# Patient Record
Sex: MALE | Race: WHITE | NOT HISPANIC OR LATINO
[De-identification: names, ages, dates, MRNs, and addresses within clinical notes are randomized per-mention and may not be internally consistent; named-entity substitution may affect disease eponyms.]

---

## 2017-06-26 ENCOUNTER — APPOINTMENT (OUTPATIENT)
Dept: HEART AND VASCULAR | Facility: CLINIC | Age: 52
End: 2017-06-26

## 2017-10-02 ENCOUNTER — APPOINTMENT (OUTPATIENT)
Dept: HEART AND VASCULAR | Facility: CLINIC | Age: 52
End: 2017-10-02
Payer: MEDICAID

## 2017-10-02 VITALS
SYSTOLIC BLOOD PRESSURE: 126 MMHG | HEART RATE: 58 BPM | BODY MASS INDEX: 36.49 KG/M2 | WEIGHT: 240 LBS | DIASTOLIC BLOOD PRESSURE: 80 MMHG

## 2017-10-02 DIAGNOSIS — H40.9 UNSPECIFIED GLAUCOMA: ICD-10-CM

## 2017-10-02 PROCEDURE — 99214 OFFICE O/P EST MOD 30 MIN: CPT | Mod: 25

## 2017-10-02 PROCEDURE — 93000 ELECTROCARDIOGRAM COMPLETE: CPT

## 2017-10-02 RX ORDER — INSULIN GLARGINE 300 U/ML
300 INJECTION, SOLUTION SUBCUTANEOUS
Refills: 0 | Status: ACTIVE | COMMUNITY

## 2017-11-05 ENCOUNTER — FORM ENCOUNTER (OUTPATIENT)
Age: 52
End: 2017-11-05

## 2017-11-06 ENCOUNTER — OUTPATIENT (OUTPATIENT)
Dept: OUTPATIENT SERVICES | Facility: HOSPITAL | Age: 52
LOS: 1 days | End: 2017-11-06
Payer: COMMERCIAL

## 2017-11-06 DIAGNOSIS — F17.200 NICOTINE DEPENDENCE, UNSPECIFIED, UNCOMPLICATED: ICD-10-CM

## 2017-11-06 DIAGNOSIS — I25.10 ATHEROSCLEROTIC HEART DISEASE OF NATIVE CORONARY ARTERY WITHOUT ANGINA PECTORIS: ICD-10-CM

## 2017-11-06 DIAGNOSIS — E78.5 HYPERLIPIDEMIA, UNSPECIFIED: ICD-10-CM

## 2017-11-06 DIAGNOSIS — E11.9 TYPE 2 DIABETES MELLITUS WITHOUT COMPLICATIONS: ICD-10-CM

## 2017-11-06 LAB — GLUCOSE BLDC GLUCOMTR-MCNC: 135 MG/DL — HIGH (ref 70–99)

## 2017-11-06 PROCEDURE — 93016 CV STRESS TEST SUPVJ ONLY: CPT

## 2017-11-06 PROCEDURE — 78452 HT MUSCLE IMAGE SPECT MULT: CPT | Mod: 26

## 2017-11-06 PROCEDURE — 82962 GLUCOSE BLOOD TEST: CPT

## 2017-11-06 PROCEDURE — 93017 CV STRESS TEST TRACING ONLY: CPT

## 2017-11-06 PROCEDURE — 93018 CV STRESS TEST I&R ONLY: CPT

## 2017-11-06 PROCEDURE — A9500: CPT

## 2017-11-06 PROCEDURE — 78452 HT MUSCLE IMAGE SPECT MULT: CPT

## 2017-11-09 ENCOUNTER — TRANSCRIPTION ENCOUNTER (OUTPATIENT)
Age: 52
End: 2017-11-09

## 2018-01-23 ENCOUNTER — RX RENEWAL (OUTPATIENT)
Age: 53
End: 2018-01-23

## 2018-03-27 ENCOUNTER — APPOINTMENT (OUTPATIENT)
Dept: HEART AND VASCULAR | Facility: CLINIC | Age: 53
End: 2018-03-27
Payer: MEDICAID

## 2018-03-27 VITALS
HEIGHT: 68 IN | BODY MASS INDEX: 36.37 KG/M2 | DIASTOLIC BLOOD PRESSURE: 62 MMHG | WEIGHT: 240 LBS | SYSTOLIC BLOOD PRESSURE: 102 MMHG | HEART RATE: 61 BPM

## 2018-03-27 DIAGNOSIS — R07.89 OTHER CHEST PAIN: ICD-10-CM

## 2018-03-27 PROCEDURE — 93000 ELECTROCARDIOGRAM COMPLETE: CPT

## 2018-03-27 PROCEDURE — 99214 OFFICE O/P EST MOD 30 MIN: CPT | Mod: 25

## 2018-03-27 RX ORDER — INSULIN GLARGINE 100 [IU]/ML
100 INJECTION, SOLUTION SUBCUTANEOUS
Qty: 15 | Refills: 0 | Status: DISCONTINUED | COMMUNITY
Start: 2018-01-23

## 2018-03-27 RX ORDER — TERBINAFINE HYDROCHLORIDE 1 G/100G
1 CREAM TOPICAL
Qty: 60 | Refills: 0 | Status: DISCONTINUED | COMMUNITY
Start: 2018-01-22

## 2018-03-27 RX ORDER — BIMATOPROST 0.1 MG/ML
0.01 SOLUTION/ DROPS OPHTHALMIC
Qty: 2 | Refills: 0 | Status: ACTIVE | COMMUNITY
Start: 2017-12-19

## 2018-03-27 RX ORDER — MELOXICAM 15 MG/1
15 TABLET ORAL
Qty: 30 | Refills: 0 | Status: DISCONTINUED | COMMUNITY
Start: 2018-03-04

## 2018-03-27 RX ORDER — METFORMIN HYDROCHLORIDE 1000 MG/1
1000 TABLET, COATED ORAL
Qty: 60 | Refills: 0 | Status: ACTIVE | COMMUNITY
Start: 2017-12-25

## 2018-03-27 RX ORDER — BLOOD-GLUCOSE METER
32G X 4 MM EACH MISCELLANEOUS
Qty: 100 | Refills: 0 | Status: ACTIVE | COMMUNITY
Start: 2017-12-14

## 2018-03-27 RX ORDER — BLOOD-GLUCOSE METER
EACH MISCELLANEOUS
Qty: 100 | Refills: 0 | Status: ACTIVE | COMMUNITY
Start: 2018-01-15

## 2018-03-27 RX ORDER — BLOOD SUGAR DIAGNOSTIC
STRIP MISCELLANEOUS
Qty: 100 | Refills: 0 | Status: ACTIVE | COMMUNITY
Start: 2018-01-14

## 2018-03-27 RX ORDER — KETOCONAZOLE 200 MG/1
200 TABLET ORAL
Qty: 10 | Refills: 0 | Status: DISCONTINUED | COMMUNITY
Start: 2018-01-18

## 2018-03-27 RX ORDER — BUPROPION HYDROCHLORIDE 150 MG/1
150 TABLET, EXTENDED RELEASE ORAL
Qty: 60 | Refills: 0 | Status: DISCONTINUED | COMMUNITY
Start: 2017-10-17

## 2018-03-27 RX ORDER — INSULIN ASPART 100 [IU]/ML
100 INJECTION, SOLUTION INTRAVENOUS; SUBCUTANEOUS
Qty: 15 | Refills: 0 | Status: ACTIVE | COMMUNITY
Start: 2018-02-28

## 2018-03-27 RX ORDER — ALPRAZOLAM 0.25 MG/1
0.25 TABLET ORAL
Qty: 90 | Refills: 0 | Status: ACTIVE | COMMUNITY
Start: 2018-01-02

## 2018-03-27 RX ORDER — FENOFIBRATE 134 MG/1
134 CAPSULE ORAL
Qty: 30 | Refills: 0 | Status: DISCONTINUED | COMMUNITY
Start: 2017-12-13

## 2018-04-08 ENCOUNTER — CLINICAL ADVICE (OUTPATIENT)
Age: 53
End: 2018-04-08

## 2018-04-10 ENCOUNTER — RX RENEWAL (OUTPATIENT)
Age: 53
End: 2018-04-10

## 2018-08-06 VITALS
RESPIRATION RATE: 18 BRPM | OXYGEN SATURATION: 97 % | DIASTOLIC BLOOD PRESSURE: 73 MMHG | SYSTOLIC BLOOD PRESSURE: 108 MMHG | TEMPERATURE: 99 F | HEART RATE: 84 BPM

## 2018-08-06 LAB
ALBUMIN SERPL ELPH-MCNC: 4.4 G/DL — SIGNIFICANT CHANGE UP (ref 3.3–5)
ALP SERPL-CCNC: 58 U/L — SIGNIFICANT CHANGE UP (ref 40–120)
ALT FLD-CCNC: 23 U/L — SIGNIFICANT CHANGE UP (ref 10–45)
ANION GAP SERPL CALC-SCNC: 15 MMOL/L — SIGNIFICANT CHANGE UP (ref 5–17)
APPEARANCE UR: CLEAR — SIGNIFICANT CHANGE UP
APTT BLD: 25.9 SEC — LOW (ref 27.5–37.4)
AST SERPL-CCNC: 19 U/L — SIGNIFICANT CHANGE UP (ref 10–40)
BASE EXCESS BLDV CALC-SCNC: 2.4 MMOL/L — SIGNIFICANT CHANGE UP
BILIRUB SERPL-MCNC: 0.9 MG/DL — SIGNIFICANT CHANGE UP (ref 0.2–1.2)
BILIRUB UR-MCNC: NEGATIVE — SIGNIFICANT CHANGE UP
BUN SERPL-MCNC: 20 MG/DL — SIGNIFICANT CHANGE UP (ref 7–23)
CA-I SERPL-SCNC: 1.19 MMOL/L — SIGNIFICANT CHANGE UP (ref 1.12–1.3)
CALCIUM SERPL-MCNC: 9.5 MG/DL — SIGNIFICANT CHANGE UP (ref 8.4–10.5)
CHLORIDE SERPL-SCNC: 96 MMOL/L — SIGNIFICANT CHANGE UP (ref 96–108)
CK MB CFR SERPL CALC: 2.7 NG/ML — SIGNIFICANT CHANGE UP (ref 0–6.7)
CK SERPL-CCNC: 50 U/L — SIGNIFICANT CHANGE UP (ref 30–200)
CO2 SERPL-SCNC: 25 MMOL/L — SIGNIFICANT CHANGE UP (ref 22–31)
COLOR SPEC: YELLOW — SIGNIFICANT CHANGE UP
CREAT SERPL-MCNC: 0.99 MG/DL — SIGNIFICANT CHANGE UP (ref 0.5–1.3)
DIFF PNL FLD: NEGATIVE — SIGNIFICANT CHANGE UP
GAS PNL BLDV: 138 MMOL/L — SIGNIFICANT CHANGE UP (ref 138–146)
GAS PNL BLDV: SIGNIFICANT CHANGE UP
GAS PNL BLDV: SIGNIFICANT CHANGE UP
GLUCOSE SERPL-MCNC: 181 MG/DL — HIGH (ref 70–99)
GLUCOSE UR QL: >=1000
HCO3 BLDV-SCNC: 28 MMOL/L — HIGH (ref 20–27)
HCT VFR BLD CALC: 50.7 % — HIGH (ref 39–50)
HGB BLD-MCNC: 17.4 G/DL — HIGH (ref 13–17)
INR BLD: 0.96 — SIGNIFICANT CHANGE UP (ref 0.88–1.16)
KETONES UR-MCNC: NEGATIVE — SIGNIFICANT CHANGE UP
LACTATE SERPL-SCNC: 2 MMOL/L — SIGNIFICANT CHANGE UP (ref 0.5–2)
LEUKOCYTE ESTERASE UR-ACNC: NEGATIVE — SIGNIFICANT CHANGE UP
LYMPHOCYTES # BLD AUTO: 9 % — LOW (ref 13–44)
MCHC RBC-ENTMCNC: 29.4 PG — SIGNIFICANT CHANGE UP (ref 27–34)
MCHC RBC-ENTMCNC: 34.3 G/DL — SIGNIFICANT CHANGE UP (ref 32–36)
MCV RBC AUTO: 85.8 FL — SIGNIFICANT CHANGE UP (ref 80–100)
MONOCYTES NFR BLD AUTO: 4 % — SIGNIFICANT CHANGE UP (ref 2–14)
NEUTROPHILS NFR BLD AUTO: 84 % — HIGH (ref 43–77)
NEUTS BAND # BLD: 3 % — SIGNIFICANT CHANGE UP
NITRITE UR-MCNC: NEGATIVE — SIGNIFICANT CHANGE UP
PCO2 BLDV: 44 MMHG — SIGNIFICANT CHANGE UP (ref 41–51)
PH BLDV: 7.42 — SIGNIFICANT CHANGE UP (ref 7.32–7.43)
PH UR: 6.5 — SIGNIFICANT CHANGE UP (ref 5–8)
PLAT MORPH BLD: NORMAL — SIGNIFICANT CHANGE UP
PLATELET # BLD AUTO: 192 K/UL — SIGNIFICANT CHANGE UP (ref 150–400)
PO2 BLDV: 34 MMHG — SIGNIFICANT CHANGE UP
POTASSIUM BLDV-SCNC: 4.1 MMOL/L — SIGNIFICANT CHANGE UP (ref 3.5–4.9)
POTASSIUM SERPL-MCNC: 4.2 MMOL/L — SIGNIFICANT CHANGE UP (ref 3.5–5.3)
POTASSIUM SERPL-SCNC: 4.2 MMOL/L — SIGNIFICANT CHANGE UP (ref 3.5–5.3)
PROT SERPL-MCNC: 7.5 G/DL — SIGNIFICANT CHANGE UP (ref 6–8.3)
PROT UR-MCNC: NEGATIVE MG/DL — SIGNIFICANT CHANGE UP
PROTHROM AB SERPL-ACNC: 10.6 SEC — SIGNIFICANT CHANGE UP (ref 9.8–12.7)
RBC # BLD: 5.91 M/UL — HIGH (ref 4.2–5.8)
RBC # FLD: 13.8 % — SIGNIFICANT CHANGE UP (ref 10.3–16.9)
RBC BLD AUTO: NORMAL — SIGNIFICANT CHANGE UP
SAO2 % BLDV: 54 % — SIGNIFICANT CHANGE UP
SODIUM SERPL-SCNC: 136 MMOL/L — SIGNIFICANT CHANGE UP (ref 135–145)
SP GR SPEC: 1.01 — SIGNIFICANT CHANGE UP (ref 1–1.03)
TROPONIN T SERPL-MCNC: <0.01 NG/ML — SIGNIFICANT CHANGE UP (ref 0–0.01)
UROBILINOGEN FLD QL: 2 E.U./DL
WBC # BLD: 12.2 K/UL — HIGH (ref 3.8–10.5)
WBC # FLD AUTO: 12.2 K/UL — HIGH (ref 3.8–10.5)

## 2018-08-06 PROCEDURE — 70450 CT HEAD/BRAIN W/O DYE: CPT | Mod: 26

## 2018-08-06 PROCEDURE — 71045 X-RAY EXAM CHEST 1 VIEW: CPT | Mod: 26

## 2018-08-06 RX ORDER — CIPROFLOXACIN LACTATE 400MG/40ML
400 VIAL (ML) INTRAVENOUS ONCE
Qty: 0 | Refills: 0 | Status: COMPLETED | OUTPATIENT
Start: 2018-08-06 | End: 2018-08-06

## 2018-08-06 RX ORDER — SODIUM CHLORIDE 9 MG/ML
1000 INJECTION INTRAMUSCULAR; INTRAVENOUS; SUBCUTANEOUS ONCE
Qty: 0 | Refills: 0 | Status: DISCONTINUED | OUTPATIENT
Start: 2018-08-06 | End: 2018-08-06

## 2018-08-06 RX ORDER — METRONIDAZOLE 500 MG
500 TABLET ORAL ONCE
Qty: 0 | Refills: 0 | Status: COMPLETED | OUTPATIENT
Start: 2018-08-06 | End: 2018-08-06

## 2018-08-06 RX ORDER — SODIUM CHLORIDE 9 MG/ML
1000 INJECTION INTRAMUSCULAR; INTRAVENOUS; SUBCUTANEOUS ONCE
Qty: 0 | Refills: 0 | Status: COMPLETED | OUTPATIENT
Start: 2018-08-06 | End: 2018-08-06

## 2018-08-06 RX ORDER — IOHEXOL 300 MG/ML
30 INJECTION, SOLUTION INTRAVENOUS ONCE
Qty: 0 | Refills: 0 | Status: COMPLETED | OUTPATIENT
Start: 2018-08-06 | End: 2018-08-06

## 2018-08-06 RX ORDER — ACETAMINOPHEN 500 MG
650 TABLET ORAL ONCE
Qty: 0 | Refills: 0 | Status: COMPLETED | OUTPATIENT
Start: 2018-08-06 | End: 2018-08-06

## 2018-08-06 RX ADMIN — IOHEXOL 30 MILLILITER(S): 300 INJECTION, SOLUTION INTRAVENOUS at 21:39

## 2018-08-06 RX ADMIN — SODIUM CHLORIDE 1000 MILLILITER(S): 9 INJECTION INTRAMUSCULAR; INTRAVENOUS; SUBCUTANEOUS at 19:40

## 2018-08-06 RX ADMIN — Medication 200 MILLIGRAM(S): at 22:21

## 2018-08-06 RX ADMIN — Medication 650 MILLIGRAM(S): at 20:57

## 2018-08-06 RX ADMIN — Medication 100 MILLIGRAM(S): at 21:39

## 2018-08-06 NOTE — ED PROVIDER NOTE - MEDICAL DECISION MAKING DETAILS
53 yo male smoker with fever to 100.8 F  abd pain llq - near syncope-neg trop  no ekg changes-  awaiting urine and CT abd pelvis - possible admit based on CT

## 2018-08-06 NOTE — ED ADULT NURSE NOTE - OBJECTIVE STATEMENT
Pt with near syncope earlier today, felt very dizzy along with diaphoresis. Denies CP, palpitations, SOB, or fall at time of event. Subjective fever today. Pt AAOx3, resp even and unlabored, wife at BS.

## 2018-08-06 NOTE — ED ADULT NURSE NOTE - NSIMPLEMENTINTERV_GEN_ALL_ED
Implemented All Universal Safety Interventions:  [place] to call system. Call bell, personal items and telephone within reach. Instruct patient to call for assistance. Room bathroom lighting operational. Non-slip footwear when patient is off stretcher. Physically safe environment: no spills, clutter or unnecessary equipment. Stretcher in lowest position, wheels locked, appropriate side rails in place.

## 2018-08-06 NOTE — ED ADULT TRIAGE NOTE - CHIEF COMPLAINT QUOTE
BIBA c/o near syncope, states " I was feeling dizzy and just lay before I could fall". pt A&Ox4, denies any pain at this time. no weakness or facial droop noted. FS on the field.

## 2018-08-06 NOTE — ED PROVIDER NOTE - CARE PLAN
Principal Discharge DX:	Near syncope  Secondary Diagnosis:	Fever Principal Discharge DX:	Near syncope  Secondary Diagnosis:	Fever  Secondary Diagnosis:	Abdominal pain

## 2018-08-06 NOTE — ED PROVIDER NOTE - OBJECTIVE STATEMENT
51 yo male smoker hx of CAD HTN DM states he felt weak earlier 3 hr ago as though he could pass out- no dizziness or weakness - no room spinning  no chest pain or back pain - no N/V or abd pain no diarrhea ? EMS thought pt may have had slight facial asymmetry- also wife said intermittent facial drooping since Saturday  no headache or upper ext weakness no gait instability- no prior hx of CVa or TIA - also feels as though he is having chills at times  no cough or sore throat  no tick or [place] bites no skin rashes 53 yo male smoker hx of CAD HTN DM states he felt weak earlier 3 hr ago as though he could pass out- no dizziness or weakness - no room spinning  no chest pain or back pain - no N/V mild lower abd pain- no diarrhea ? EMS thought pt may have had slight facial asymmetry- also wife said intermittent facial drooping since Saturday  no headache or upper ext weakness no gait instability- no prior hx of CVa or TIA - also feels as though he is having chills at times  no cough or sore throat  no tick or [place] bites no skin rashes 51 yo male smoker hx of CAD HTN DM states he felt weak earlier 3 hr ago as though he could pass out- no dizziness or weakness - no room spinning  no chest pain or back pain - no N/V mild lower abd pain- prior hx of SBO/ has residual hernia  no diarrhea ? EMS thought pt may have had slight facial asymmetry- also wife said intermittent facial drooping since Saturday  no headache or upper ext weakness no gait instability- no prior hx of CVA or TIA - also feels as though he is having chills at times  no cough or sore throat  no tick or mosquito bites no skin rashes 51 yo male smoker hx of CAD HTN DM states he felt weak earlier 3 hr ago as though he could pass out- no dizziness or weakness - no room spinning  no chest pain or back pain - no N/V mild lower abd pain- prior hx of gastric sleeve--subsequent SBO/ has residual hernia  no diarrhea ? EMS thought pt may have had slight facial asymmetry- also wife said intermittent facial drooping since Saturday  no headache or upper ext weakness no gait instability- no prior hx of CVA or TIA - also feels as though he is having chills at times  no cough or sore throat  no tick or mosquito bites no skin rashes

## 2018-08-07 ENCOUNTER — INPATIENT (INPATIENT)
Facility: HOSPITAL | Age: 53
LOS: 0 days | Discharge: HOME | DRG: 312 | End: 2018-08-07
Payer: COMMERCIAL

## 2018-08-07 ENCOUNTER — TRANSCRIPTION ENCOUNTER (OUTPATIENT)
Age: 53
End: 2018-08-07

## 2018-08-07 VITALS
HEART RATE: 62 BPM | RESPIRATION RATE: 18 BRPM | SYSTOLIC BLOOD PRESSURE: 119 MMHG | TEMPERATURE: 98 F | OXYGEN SATURATION: 94 % | DIASTOLIC BLOOD PRESSURE: 71 MMHG

## 2018-08-07 DIAGNOSIS — R55 SYNCOPE AND COLLAPSE: ICD-10-CM

## 2018-08-07 DIAGNOSIS — Z90.3 ACQUIRED ABSENCE OF STOMACH [PART OF]: Chronic | ICD-10-CM

## 2018-08-07 DIAGNOSIS — E11.9 TYPE 2 DIABETES MELLITUS WITHOUT COMPLICATIONS: ICD-10-CM

## 2018-08-07 DIAGNOSIS — I10 ESSENTIAL (PRIMARY) HYPERTENSION: ICD-10-CM

## 2018-08-07 DIAGNOSIS — I25.10 ATHEROSCLEROTIC HEART DISEASE OF NATIVE CORONARY ARTERY WITHOUT ANGINA PECTORIS: ICD-10-CM

## 2018-08-07 DIAGNOSIS — Z91.89 OTHER SPECIFIED PERSONAL RISK FACTORS, NOT ELSEWHERE CLASSIFIED: ICD-10-CM

## 2018-08-07 LAB
ALBUMIN SERPL ELPH-MCNC: 3.2 G/DL — LOW (ref 3.3–5)
ALP SERPL-CCNC: 44 U/L — SIGNIFICANT CHANGE UP (ref 40–120)
ALT FLD-CCNC: 20 U/L — SIGNIFICANT CHANGE UP (ref 10–45)
ANION GAP SERPL CALC-SCNC: 14 MMOL/L — SIGNIFICANT CHANGE UP (ref 5–17)
AST SERPL-CCNC: 17 U/L — SIGNIFICANT CHANGE UP (ref 10–40)
BASOPHILS NFR BLD AUTO: 0.1 % — SIGNIFICANT CHANGE UP (ref 0–2)
BILIRUB SERPL-MCNC: 1.2 MG/DL — SIGNIFICANT CHANGE UP (ref 0.2–1.2)
BUN SERPL-MCNC: 18 MG/DL — SIGNIFICANT CHANGE UP (ref 7–23)
CALCIUM SERPL-MCNC: 8.2 MG/DL — LOW (ref 8.4–10.5)
CHLORIDE SERPL-SCNC: 103 MMOL/L — SIGNIFICANT CHANGE UP (ref 96–108)
CHOLEST SERPL-MCNC: 114 MG/DL — SIGNIFICANT CHANGE UP (ref 10–199)
CO2 SERPL-SCNC: 23 MMOL/L — SIGNIFICANT CHANGE UP (ref 22–31)
CREAT SERPL-MCNC: 0.93 MG/DL — SIGNIFICANT CHANGE UP (ref 0.5–1.3)
EOSINOPHIL NFR BLD AUTO: 1.2 % — SIGNIFICANT CHANGE UP (ref 0–6)
GLUCOSE BLDC GLUCOMTR-MCNC: 104 MG/DL — HIGH (ref 70–99)
GLUCOSE BLDC GLUCOMTR-MCNC: 119 MG/DL — HIGH (ref 70–99)
GLUCOSE SERPL-MCNC: 128 MG/DL — HIGH (ref 70–99)
HBA1C BLD-MCNC: 8.7 % — HIGH (ref 4–5.6)
HCT VFR BLD CALC: 45.8 % — SIGNIFICANT CHANGE UP (ref 39–50)
HDLC SERPL-MCNC: 26 MG/DL — LOW (ref 40–125)
HGB BLD-MCNC: 15.3 G/DL — SIGNIFICANT CHANGE UP (ref 13–17)
LIPID PNL WITH DIRECT LDL SERPL: 58 MG/DL — SIGNIFICANT CHANGE UP
LYMPHOCYTES # BLD AUTO: 14 % — SIGNIFICANT CHANGE UP (ref 13–44)
MAGNESIUM SERPL-MCNC: 1.7 MG/DL — SIGNIFICANT CHANGE UP (ref 1.6–2.6)
MCHC RBC-ENTMCNC: 29 PG — SIGNIFICANT CHANGE UP (ref 27–34)
MCHC RBC-ENTMCNC: 33.4 G/DL — SIGNIFICANT CHANGE UP (ref 32–36)
MCV RBC AUTO: 86.7 FL — SIGNIFICANT CHANGE UP (ref 80–100)
MONOCYTES NFR BLD AUTO: 6.3 % — SIGNIFICANT CHANGE UP (ref 2–14)
NEUTROPHILS NFR BLD AUTO: 78.4 % — HIGH (ref 43–77)
PLATELET # BLD AUTO: 155 K/UL — SIGNIFICANT CHANGE UP (ref 150–400)
POTASSIUM SERPL-MCNC: 3.6 MMOL/L — SIGNIFICANT CHANGE UP (ref 3.5–5.3)
POTASSIUM SERPL-SCNC: 3.6 MMOL/L — SIGNIFICANT CHANGE UP (ref 3.5–5.3)
PROT SERPL-MCNC: 6 G/DL — SIGNIFICANT CHANGE UP (ref 6–8.3)
RAPID RVP RESULT: SIGNIFICANT CHANGE UP
RBC # BLD: 5.28 M/UL — SIGNIFICANT CHANGE UP (ref 4.2–5.8)
RBC # FLD: 13.9 % — SIGNIFICANT CHANGE UP (ref 10.3–16.9)
SODIUM SERPL-SCNC: 140 MMOL/L — SIGNIFICANT CHANGE UP (ref 135–145)
TOTAL CHOLESTEROL/HDL RATIO MEASUREMENT: 4.4 RATIO — SIGNIFICANT CHANGE UP (ref 3.4–9.6)
TRIGL SERPL-MCNC: 152 MG/DL — HIGH (ref 10–149)
WBC # BLD: 8.1 K/UL — SIGNIFICANT CHANGE UP (ref 3.8–10.5)
WBC # FLD AUTO: 8.1 K/UL — SIGNIFICANT CHANGE UP (ref 3.8–10.5)

## 2018-08-07 PROCEDURE — 99285 EMERGENCY DEPT VISIT HI MDM: CPT | Mod: 25

## 2018-08-07 PROCEDURE — 71045 X-RAY EXAM CHEST 1 VIEW: CPT | Mod: 26

## 2018-08-07 PROCEDURE — 74177 CT ABD & PELVIS W/CONTRAST: CPT | Mod: 26

## 2018-08-07 PROCEDURE — 99238 HOSP IP/OBS DSCHRG MGMT 30/<: CPT

## 2018-08-07 PROCEDURE — 93306 TTE W/DOPPLER COMPLETE: CPT | Mod: 26

## 2018-08-07 PROCEDURE — 93010 ELECTROCARDIOGRAM REPORT: CPT

## 2018-08-07 RX ORDER — PANTOPRAZOLE SODIUM 20 MG/1
40 TABLET, DELAYED RELEASE ORAL
Qty: 0 | Refills: 0 | Status: DISCONTINUED | OUTPATIENT
Start: 2018-08-07 | End: 2018-08-07

## 2018-08-07 RX ORDER — METOPROLOL TARTRATE 50 MG
50 TABLET ORAL DAILY
Qty: 0 | Refills: 0 | Status: DISCONTINUED | OUTPATIENT
Start: 2018-08-07 | End: 2018-08-07

## 2018-08-07 RX ORDER — ASPIRIN/CALCIUM CARB/MAGNESIUM 324 MG
81 TABLET ORAL DAILY
Qty: 0 | Refills: 0 | Status: DISCONTINUED | OUTPATIENT
Start: 2018-08-07 | End: 2018-08-07

## 2018-08-07 RX ORDER — INSULIN LISPRO 100/ML
VIAL (ML) SUBCUTANEOUS AT BEDTIME
Qty: 0 | Refills: 0 | Status: DISCONTINUED | OUTPATIENT
Start: 2018-08-07 | End: 2018-08-07

## 2018-08-07 RX ORDER — ASPIRIN/CALCIUM CARB/MAGNESIUM 324 MG
0 TABLET ORAL
Qty: 0 | Refills: 0 | COMMUNITY

## 2018-08-07 RX ORDER — PANTOPRAZOLE SODIUM 20 MG/1
1 TABLET, DELAYED RELEASE ORAL
Qty: 0 | Refills: 0 | COMMUNITY

## 2018-08-07 RX ORDER — ESCITALOPRAM OXALATE 10 MG/1
20 TABLET, FILM COATED ORAL DAILY
Qty: 0 | Refills: 0 | Status: DISCONTINUED | OUTPATIENT
Start: 2018-08-07 | End: 2018-08-07

## 2018-08-07 RX ORDER — DEXTROSE 50 % IN WATER 50 %
25 SYRINGE (ML) INTRAVENOUS ONCE
Qty: 0 | Refills: 0 | Status: DISCONTINUED | OUTPATIENT
Start: 2018-08-07 | End: 2018-08-07

## 2018-08-07 RX ORDER — SODIUM CHLORIDE 9 MG/ML
1000 INJECTION, SOLUTION INTRAVENOUS
Qty: 0 | Refills: 0 | Status: DISCONTINUED | OUTPATIENT
Start: 2018-08-07 | End: 2018-08-07

## 2018-08-07 RX ORDER — ATORVASTATIN CALCIUM 80 MG/1
1 TABLET, FILM COATED ORAL
Qty: 0 | Refills: 0 | COMMUNITY

## 2018-08-07 RX ORDER — INSULIN GLARGINE 100 [IU]/ML
44 INJECTION, SOLUTION SUBCUTANEOUS
Qty: 0 | Refills: 0 | COMMUNITY

## 2018-08-07 RX ORDER — GLUCAGON INJECTION, SOLUTION 0.5 MG/.1ML
1 INJECTION, SOLUTION SUBCUTANEOUS ONCE
Qty: 0 | Refills: 0 | Status: DISCONTINUED | OUTPATIENT
Start: 2018-08-07 | End: 2018-08-07

## 2018-08-07 RX ORDER — FENOFIBRATE,MICRONIZED 130 MG
1 CAPSULE ORAL
Qty: 0 | Refills: 0 | COMMUNITY

## 2018-08-07 RX ORDER — DEXTROSE 50 % IN WATER 50 %
12.5 SYRINGE (ML) INTRAVENOUS ONCE
Qty: 0 | Refills: 0 | Status: DISCONTINUED | OUTPATIENT
Start: 2018-08-07 | End: 2018-08-07

## 2018-08-07 RX ORDER — INSULIN GLARGINE 100 [IU]/ML
40 INJECTION, SOLUTION SUBCUTANEOUS AT BEDTIME
Qty: 0 | Refills: 0 | Status: DISCONTINUED | OUTPATIENT
Start: 2018-08-07 | End: 2018-08-07

## 2018-08-07 RX ORDER — METFORMIN HYDROCHLORIDE 850 MG/1
1 TABLET ORAL
Qty: 0 | Refills: 0 | COMMUNITY

## 2018-08-07 RX ORDER — METOPROLOL TARTRATE 50 MG
1 TABLET ORAL
Qty: 0 | Refills: 0 | COMMUNITY

## 2018-08-07 RX ORDER — INSULIN LISPRO 100/ML
VIAL (ML) SUBCUTANEOUS
Qty: 0 | Refills: 0 | Status: DISCONTINUED | OUTPATIENT
Start: 2018-08-07 | End: 2018-08-07

## 2018-08-07 RX ORDER — DEXTROSE 50 % IN WATER 50 %
15 SYRINGE (ML) INTRAVENOUS ONCE
Qty: 0 | Refills: 0 | Status: DISCONTINUED | OUTPATIENT
Start: 2018-08-07 | End: 2018-08-07

## 2018-08-07 RX ORDER — CANAGLIFLOZIN 100 MG/1
1 TABLET, FILM COATED ORAL
Qty: 0 | Refills: 0 | COMMUNITY

## 2018-08-07 RX ORDER — ATORVASTATIN CALCIUM 80 MG/1
20 TABLET, FILM COATED ORAL AT BEDTIME
Qty: 0 | Refills: 0 | Status: DISCONTINUED | OUTPATIENT
Start: 2018-08-07 | End: 2018-08-07

## 2018-08-07 RX ORDER — ESCITALOPRAM OXALATE 10 MG/1
1 TABLET, FILM COATED ORAL
Qty: 0 | Refills: 0 | COMMUNITY

## 2018-08-07 RX ADMIN — PANTOPRAZOLE SODIUM 40 MILLIGRAM(S): 20 TABLET, DELAYED RELEASE ORAL at 07:22

## 2018-08-07 RX ADMIN — Medication 50 MILLIGRAM(S): at 09:27

## 2018-08-07 NOTE — CONSULT NOTE ADULT - SUBJECTIVE AND OBJECTIVE BOX
GENERAL SURGERY CONSULT NOTE    HPI: 54 yo M w/ known ventral hernia, presenting after LOC due to syncopal episode. Patient reports that he has had this bulge in his abdomen for some time now, unsure when it began. He does not experience pain from hernia, and it freely comes in and out. Patient reports it has never been stuck. No issues with passing gas, or bowel movements. No vomiting, although patient was nauseous after passing out today.     Had a fever of 100.8 when he presented to ED.       PAST MEDICAL & SURGICAL HISTORY:  Hx of Gastric band,   LSG 3 years ago  SBO 3 years ago  CAD  DMII with insulin  Smoker 1 ppd      REVIEW OF SYSTEMS:   Pertinent positives/negatives noted in HPI.     HOME MEDICATIONS:  Insulin    ALLERGIES:  Allergies    vancomycin (Rash)  Zosyn (Rash)    Intolerances        SOCIAL HISTORY:    FAMILY HISTORY:      Vital Signs Last 24 Hrs  T(C): 37.6 (07 Aug 2018 00:24), Max: 38.2 (06 Aug 2018 21:05)  T(F): 99.6 (07 Aug 2018 00:24), Max: 100.8 (06 Aug 2018 21:05)  HR: 78 (07 Aug 2018 00:24) (78 - 85)  BP: 111/69 (07 Aug 2018 00:24) (108/73 - 111/69)  BP(mean): --  RR: 16 (07 Aug 2018 00:24) (16 - 18)  SpO2: 98% (07 Aug 2018 00:24) (97% - 99%)    PHYSICAL EXAM:  General: NAD  Abdominal: Soft, Large reducible hernia superior to umbilicus. NT, ND, on exam  Extremities: No edema,    LABS:                        17.4   12.2  )-----------( 192      ( 06 Aug 2018 19:42 )             50.7     -    136  |  96  |  20  ----------------------------<  181<H>  4.2   |  25  |  0.99    Ca    9.5      06 Aug 2018 19:42    TPro  7.5  /  Alb  4.4  /  TBili  0.9  /  DBili  x   /  AST  19  /  ALT  23  /  AlkPhos  58  08-    PT/INR - ( 06 Aug 2018 19:42 )   PT: 10.6 sec;   INR: 0.96          PTT - ( 06 Aug 2018 19:42 )  PTT:25.9 sec  Urinalysis Basic - ( 06 Aug 2018 23:36 )    Color: Yellow / Appearance: Clear / S.010 / pH: x  Gluc: x / Ketone: NEGATIVE  / Bili: Negative / Urobili: 2.0 E.U./dL   Blood: x / Protein: NEGATIVE mg/dL / Nitrite: NEGATIVE   Leuk Esterase: NEGATIVE / RBC: x / WBC x   Sq Epi: x / Non Sq Epi: x / Bacteria: x    < from: CT Abdomen and Pelvis w/ Oral Cont and w/ IV Cont (18 @ 00:20) >  Bowel/Peritoneum/Abdominal wall: Interval removal of gastric band and   sleeve gastrectomy. Three new midline upper ventral wall wide neck   hernias contain bowel. Largest is a moderate-sized hernia which is   located most superiorly and wall hernia contains nonobstructed small   bowel and transverse colon. No appreciable bowel wall thickening. Normal  appendix. No ascites or pneumoperitoneum. Small fat-containing bilateral   inguinal hernias.    < end of copied text >

## 2018-08-07 NOTE — DISCHARGE NOTE ADULT - HOSPITAL COURSE
51 yo M presenting to ED for one episode of pre-syncope, denies fall or head trauma, no hx of CVA/ TIA or seizures in the past.  PMH of CAD/ HTN/ DM type 2 Sleeve gastrectomy ~ 2 years ago cb SBO and abdominal ventral hernia. He was given 2L NS Bolus and symptoms resolved. Echocardiogram showed Normal EF and no valvular abnormalities. Patient was discharged home with plans for outpatient follow.

## 2018-08-07 NOTE — DISCHARGE NOTE ADULT - CARE PLAN
Principal Discharge DX:	Near syncope  Goal:	to resolve symptoms  Assessment and plan of treatment:	You were admitted for a condition called near syncope. This was most likely from being dehydrated and having an acute vaso-vagal response. The vasovagal syncope trigger causes your heart rate and blood pressure to drop suddenly. That leads to reduced blood flow to your brain, causing you to briefly lose consciousness.    Vasovagal syncope is usually harmless and requires no treatment. But it's possible you may injure yourself during a vasovagal syncope episode. Your doctor may recommend tests to rule out more serious causes of fainting, such as heart disorders.

## 2018-08-07 NOTE — DISCHARGE NOTE ADULT - PLAN OF CARE
to resolve symptoms You were admitted for a condition called near syncope. This was most likely from being dehydrated and having an acute vaso-vagal response. The vasovagal syncope trigger causes your heart rate and blood pressure to drop suddenly. That leads to reduced blood flow to your brain, causing you to briefly lose consciousness.    Vasovagal syncope is usually harmless and requires no treatment. But it's possible you may injure yourself during a vasovagal syncope episode. Your doctor may recommend tests to rule out more serious causes of fainting, such as heart disorders.

## 2018-08-07 NOTE — H&P ADULT - PROBLEM SELECTOR PLAN 2
stable and asymptomatic, follows with Dr. Tamayo, EST from 2017 with normal results.  - cont statin/ Bb and ASA  - follow up with Dr. Tamayo as scheduled  - obtain an echocardiogram prior to dc stable and asymptomatic, follows with Dr. Tamayo, EST from 2017 with normal results.  - cont statin/ Bb and ASA  - follow up with Dr. Tamayo as scheduled  - obtain an echocardiogram prior to dc  no cardiac uischaenmia discussed with Dr Tamayo

## 2018-08-07 NOTE — H&P ADULT - HISTORY OF PRESENT ILLNESS
HPI:  53 yo M presenting to ED for an episode of Presyncope.  his PMH is sig for CAD?/ HTN and type 2 DM also Hx of gastric sleeve ~ 2 years ago at [facility] with Dr. Steward cb SBO s/p resection and abdominal ventral hernia, also pt of Dr. Ye and Dr. Tamayo presenting to [facility] as he was not feeling well for a couple hours before presenting to ED also describes an episode of pre syncope as he felt that he is loosing balance and falling but denies blacking out or vertigo, denies CP/ SOB/ abdominal pain or exertional activities associated with that episode, no N&V, no HA, blurry visions, weakness, pt denies similar symptoms in the past, no hx of CVA or TIA.  HDS in the ED, had one episode of low grade temp at 100.8 and mild leukocytosis, otherwise unremarkable lab results  CXR with no evidence of infiltrate/ consolidations, RVP negative, [place] UA, pt denies any focal s&s of infection, no recent travel or sick contact.  pt had a CT abdomen in the setting of large abdominal wall hernia, also seen by surgery who did not recommend any interventions at this point as there is no evidence of strangulation or obstruction. and pt completely asymptomatic.

## 2018-08-07 NOTE — H&P ADULT - PROBLEM SELECTOR PLAN 1
Impression: most likely vasovagal in the setting of dehydration and also feeling sick for a couple of hours vs cardiac etiologies given pt's hx of CAD/ smoking/DM/HTN and obesity, however normal trops and no ECG changes suggestive of acute ischemia/ pt also asymptomatic vs stroke/ TIA vs hypoglycemia  pt has BS monitor attached which shows BS of 150 around the time he experienced the pre- syncope episode and no hypoglycemia since in the hospital.   - TTE  - repeat ECG and trend trops in am Impression: most likely vasovagal in the setting of dehydration and also feeling sick for a couple of hours vs cardiac etiologies given pt's hx of CAD/ smoking/DM/HTN and obesity, however normal trops and no ECG changes suggestive of acute ischemia/ pt also asymptomatic vs stroke/ TIA vs hypoglycemia  pt has BS monitor attached which shows BS of 150 around the time he experienced the pre- syncope episode and no hypoglycemia since in the hospital.   - TTE  - repeat ECG and trend trops in am  echo  had a recent stress test which was normal.  ECHO  most likely was a vasovagal

## 2018-08-07 NOTE — H&P ADULT - PMH
CAD (coronary artery disease)    HTN (hypertension) CAD (coronary artery disease)    Diabetes mellitus    HTN (hypertension)    Obesity

## 2018-08-07 NOTE — DISCHARGE NOTE ADULT - PATIENT PORTAL LINK FT
You can access the [facility] Patient Portal, offered by [facility], by registering with the following website: [email or web address]

## 2018-08-07 NOTE — CONSULT NOTE ADULT - ASSESSMENT
52 yo M who presents after syncopal episode at home, found to have reducible hernia on exam. Given patient's clinical findings and history, radiologic findings, and laboratory work-up: patient's hernia is non-obstructive and easily reducible.    No surgical intervention at this time  Recommend admission for workup of fever / syncopal episode  Patient's hernia has been present for almost a year, and per patient has been unchanged.  Will follow on Team 2C.     Discussed and examined with chief on call

## 2018-08-07 NOTE — DISCHARGE NOTE ADULT - MEDICATION SUMMARY - MEDICATIONS TO TAKE
I will START or STAY ON the medications listed below when I get home from the hospital:    aspirin 81 mg oral delayed release tablet  -- tab(s) by mouth once a day  -- Indication: For CAD (coronary artery disease)    Lexapro 20 mg oral tablet  -- 1 tab(s) by mouth once a day  -- Indication: For Depression or Anxiety    metFORMIN 1000 mg oral tablet  -- 1 tab(s) by mouth 2 times a day  -- Indication: For Diabetes mellitus    Toujeo SoloStar  -- 44 unit(s) subcutaneous once a day  -- Indication: For Diabetes mellitus    Invokana 300 mg oral tablet  -- 1 tab(s) by mouth once a day  -- Indication: For Diabetes mellitus    atorvastatin 20 mg oral tablet  -- 1 tab(s) by mouth once a day  -- Indication: For CAD (coronary artery disease)    fenofibrate 134 mg oral capsule  -- 1 cap(s) by mouth once a day  -- Indication: For CAD (coronary artery disease)    Toprol-XL 50 mg oral tablet, extended release  -- 1 tab(s) by mouth once a day  -- Indication: For CAD (coronary artery disease)    Protonix 40 mg oral delayed release tablet  -- 1 tab(s) by mouth once a day  -- Indication: For GERD

## 2018-08-07 NOTE — H&P ADULT - NSHPPHYSICALEXAM_GEN_ALL_CORE
VITAL SIGNS:  T(C): 37.3 (08-07-18 @ 06:12), Max: 38.2 (08-06-18 @ 21:05)  T(F): 99.2 (08-07-18 @ 06:12), Max: 100.8 (08-06-18 @ 21:05)  HR: 88 (08-07-18 @ 06:12) (74 - 88)  BP: 103/62 (08-07-18 @ 06:12) (103/62 - 118/75)  RR: 18 (08-07-18 @ 06:12) (16 - 18)  SpO2: 99% (08-07-18 @ 06:12) (97% - 100%)    PHYSICAL EXAM:    Constitutional: WDWN resting comfortably in bed; NAD  Head: NC/AT  Eyes: PERRL, EOMI, clear conjunctiva  ENT: no oropharyngeal erythema or exudates; MMM  Neck: supple; no JVD or thyromegaly  Respiratory: CTA B/L  Cardiac: +S1/S2; RRR; no M/R/G  Gastrointestinal: soft, NT/ND; no rebound or guarding; +BSx4/ obese surgical incision well healed, large low- mid abdominal hernia bulging out on exertion, easily reducible   Back:  no CVAT B/L  Extremities: WWP, no peripheral edema, intact symmetric radial and dp pulses BL   Musculoskeletal: NROM x4; no joint swelling, tenderness or erythema  Dermatologic: skin warm, dry and intact; no rashes, wounds, or scars  Lymphatic: no submandibular or cervical LAD  Neurologic: AAOx3; CNII-XII grossly intact; no focal deficits, SILT throughout   Psychiatric: appropriate mood and affect    T/L/D: PIvs c/d/i

## 2018-08-07 NOTE — H&P ADULT - NSHPREVIEWOFSYSTEMS_GEN_ALL_CORE
CONSTITUTIONAL: + weakness, fevers or chills  EYES/ENT: No visual changes;  No vertigo or throat pain   NECK: No pain or stiffness  RESPIRATORY: No cough, wheezing, hemoptysis; No shortness of breath  CARDIOVASCULAR: No chest pain or palpitations  GASTROINTESTINAL: No abdominal or epigastric pain. No nausea, vomiting, or hematemesis; No diarrhea or constipation. No melena or hematochezia.  GENITOURINARY: No dysuria, frequency or hematuria  NEUROLOGICAL: No numbness or weakness, Dizziness +  SKIN: No itching, burning, rashes, or lesions   All other review of systems is negative unless indicated above.

## 2018-08-07 NOTE — H&P ADULT - PROBLEM SELECTOR PLAN 4
last a1c from 8/7,  8.7%, UA negative for proteinuria   - continue current home regimen of insulin/ LSS/ Invokana and metformin upon dc  - cont LSS as inpt  - increase lantus from 40 to 46 home dose at bedtime last a1c from 8/7,  8.7%, UA negative for proteinuria   - continue current home regimen of insulin/ LSS/ Invokana and metformin upon dc  - cont LSS as inpt  the episode was not related to hypoglycemia  - increase lantus from 40 to 46 home dose at bedtime

## 2018-08-07 NOTE — H&P ADULT - ASSESSMENT
A/p    53 yo M presenting to ED for one episode of pre-syncope, denies fall or head trauma, no hx of CVA/ TIA or seizures in the past.  PMH of CAD/ HTN/ DM type 2 Sleeve gastrectomy ~ 2 years ago cb SBO and abdominal ventral hernia.

## 2018-08-07 NOTE — H&P ADULT - PROBLEM SELECTOR PLAN 5
1) PCP Contacted on Admission: (N) --> Name & Phone #:  Dr. Adams Ye ([phone])  2) Date of Contact with PCP:  3) PCP Contacted at Discharge: (Y/N)  4) Summary of Handoff Given to PCP:   5) Post-Discharge Appointment Date and Location:    PPX:  No DVT Ppx required   Diet: DASH/TlC carb consistent   Dispo: possible dc today    Plan discussed with Dr. Ragland

## 2018-08-07 NOTE — H&P ADULT - NSHPLABSRESULTS_GEN_ALL_CORE
.  LABS:                         15.3   8.1   )-----------( 155      ( 07 Aug 2018 05:42 )             45.8     08-07    140  |  103  |  18  ----------------------------<  128<H>  3.6   |  23  |  0.93    Ca    8.2<L>      07 Aug 2018 05:42  Mg     1.7     08-    TPro  6.0  /  Alb  3.2<L>  /  TBili  1.2  /  DBili  x   /  AST  17  /  ALT  20  /  AlkPhos  44  08-    PT/INR - ( 06 Aug 2018 19:42 )   PT: 10.6 sec;   INR: 0.96          PTT - ( 06 Aug 2018 19:42 )  PTT:25.9 sec  Urinalysis Basic - ( 06 Aug 2018 23:36 )    Color: Yellow / Appearance: Clear / S.010 / pH: x  Gluc: x / Ketone: NEGATIVE  / Bili: Negative / Urobili: 2.0 E.U./dL   Blood: x / Protein: NEGATIVE mg/dL / Nitrite: NEGATIVE   Leuk Esterase: NEGATIVE / RBC: x / WBC x   Sq Epi: x / Non Sq Epi: x / Bacteria: x      CARDIAC MARKERS ( 06 Aug 2018 19:42 )  x     / <0.01 ng/mL / 50 U/L / x     / 2.7 ng/mL    RADIOLOGY, EKG & ADDITIONAL TESTS: Reviewed.   CT abd/pel with Iv/OC :  No acute pathology.  Several new ventral wall hernias contain nonobstructed bowel and are   likely incisional in etiology. No fat stranding or ascites within the   hernia sacs.    CTH :  1.  No intracranial abnormality.   2.  A soft-tissue mass is noted in the nasopharynx.  Further clinical   correlation is suggested.

## 2018-08-08 LAB
CULTURE RESULTS: SIGNIFICANT CHANGE UP
SPECIMEN SOURCE: SIGNIFICANT CHANGE UP

## 2018-08-11 DIAGNOSIS — F17.210 NICOTINE DEPENDENCE, CIGARETTES, UNCOMPLICATED: ICD-10-CM

## 2018-08-11 DIAGNOSIS — R55 SYNCOPE AND COLLAPSE: ICD-10-CM

## 2018-08-11 DIAGNOSIS — E66.9 OBESITY, UNSPECIFIED: ICD-10-CM

## 2018-08-11 DIAGNOSIS — Z79.4 LONG TERM (CURRENT) USE OF INSULIN: ICD-10-CM

## 2018-08-11 DIAGNOSIS — K43.9 VENTRAL HERNIA WITHOUT OBSTRUCTION OR GANGRENE: ICD-10-CM

## 2018-08-11 DIAGNOSIS — E11.9 TYPE 2 DIABETES MELLITUS WITHOUT COMPLICATIONS: ICD-10-CM

## 2018-08-11 DIAGNOSIS — E86.0 DEHYDRATION: ICD-10-CM

## 2018-08-11 DIAGNOSIS — Z98.84 BARIATRIC SURGERY STATUS: ICD-10-CM

## 2018-08-11 DIAGNOSIS — I25.10 ATHEROSCLEROTIC HEART DISEASE OF NATIVE CORONARY ARTERY WITHOUT ANGINA PECTORIS: ICD-10-CM

## 2018-08-11 DIAGNOSIS — Z88.1 ALLERGY STATUS TO OTHER ANTIBIOTIC AGENTS STATUS: ICD-10-CM

## 2018-08-12 LAB
CULTURE RESULTS: SIGNIFICANT CHANGE UP
CULTURE RESULTS: SIGNIFICANT CHANGE UP
SPECIMEN SOURCE: SIGNIFICANT CHANGE UP
SPECIMEN SOURCE: SIGNIFICANT CHANGE UP

## 2018-12-26 PROCEDURE — 74177 CT ABD & PELVIS W/CONTRAST: CPT

## 2018-12-26 PROCEDURE — 87040 BLOOD CULTURE FOR BACTERIA: CPT

## 2018-12-26 PROCEDURE — 82550 ASSAY OF CK (CPK): CPT

## 2018-12-26 PROCEDURE — 84295 ASSAY OF SERUM SODIUM: CPT

## 2018-12-26 PROCEDURE — 87086 URINE CULTURE/COLONY COUNT: CPT

## 2018-12-26 PROCEDURE — 93306 TTE W/DOPPLER COMPLETE: CPT

## 2018-12-26 PROCEDURE — 80053 COMPREHEN METABOLIC PANEL: CPT

## 2018-12-26 PROCEDURE — 70450 CT HEAD/BRAIN W/O DYE: CPT

## 2018-12-26 PROCEDURE — 87581 M.PNEUMON DNA AMP PROBE: CPT

## 2018-12-26 PROCEDURE — 83036 HEMOGLOBIN GLYCOSYLATED A1C: CPT

## 2018-12-26 PROCEDURE — 83735 ASSAY OF MAGNESIUM: CPT

## 2018-12-26 PROCEDURE — 84484 ASSAY OF TROPONIN QUANT: CPT

## 2018-12-26 PROCEDURE — 81003 URINALYSIS AUTO W/O SCOPE: CPT

## 2018-12-26 PROCEDURE — 93005 ELECTROCARDIOGRAM TRACING: CPT

## 2018-12-26 PROCEDURE — 82553 CREATINE MB FRACTION: CPT

## 2018-12-26 PROCEDURE — 96374 THER/PROPH/DIAG INJ IV PUSH: CPT | Mod: XU

## 2018-12-26 PROCEDURE — 85730 THROMBOPLASTIN TIME PARTIAL: CPT

## 2018-12-26 PROCEDURE — 96375 TX/PRO/DX INJ NEW DRUG ADDON: CPT

## 2018-12-26 PROCEDURE — 71045 X-RAY EXAM CHEST 1 VIEW: CPT

## 2018-12-26 PROCEDURE — 84132 ASSAY OF SERUM POTASSIUM: CPT

## 2018-12-26 PROCEDURE — 36415 COLL VENOUS BLD VENIPUNCTURE: CPT

## 2018-12-26 PROCEDURE — 83605 ASSAY OF LACTIC ACID: CPT

## 2018-12-26 PROCEDURE — 87486 CHLMYD PNEUM DNA AMP PROBE: CPT

## 2018-12-26 PROCEDURE — 87798 DETECT AGENT NOS DNA AMP: CPT

## 2018-12-26 PROCEDURE — 85610 PROTHROMBIN TIME: CPT

## 2018-12-26 PROCEDURE — 80061 LIPID PANEL: CPT

## 2018-12-26 PROCEDURE — 82962 GLUCOSE BLOOD TEST: CPT

## 2018-12-26 PROCEDURE — 82330 ASSAY OF CALCIUM: CPT

## 2018-12-26 PROCEDURE — 87633 RESP VIRUS 12-25 TARGETS: CPT

## 2018-12-26 PROCEDURE — 99285 EMERGENCY DEPT VISIT HI MDM: CPT | Mod: 25

## 2018-12-26 PROCEDURE — 85025 COMPLETE CBC W/AUTO DIFF WBC: CPT

## 2018-12-26 PROCEDURE — 83690 ASSAY OF LIPASE: CPT

## 2018-12-26 PROCEDURE — 82803 BLOOD GASES ANY COMBINATION: CPT

## 2019-05-06 PROBLEM — E66.9 OBESITY, UNSPECIFIED: Chronic | Status: ACTIVE | Noted: 2018-08-07

## 2019-05-06 PROBLEM — I25.10 ATHEROSCLEROTIC HEART DISEASE OF NATIVE CORONARY ARTERY WITHOUT ANGINA PECTORIS: Chronic | Status: ACTIVE | Noted: 2018-08-07

## 2019-05-06 PROBLEM — I10 ESSENTIAL (PRIMARY) HYPERTENSION: Chronic | Status: ACTIVE | Noted: 2018-08-07

## 2019-05-06 PROBLEM — E11.9 TYPE 2 DIABETES MELLITUS WITHOUT COMPLICATIONS: Chronic | Status: ACTIVE | Noted: 2018-08-07

## 2019-05-07 ENCOUNTER — RX RENEWAL (OUTPATIENT)
Age: 54
End: 2019-05-07

## 2019-05-22 ENCOUNTER — TRANSCRIPTION ENCOUNTER (OUTPATIENT)
Age: 54
End: 2019-05-22

## 2019-05-30 ENCOUNTER — APPOINTMENT (OUTPATIENT)
Dept: HEART AND VASCULAR | Facility: CLINIC | Age: 54
End: 2019-05-30
Payer: MEDICAID

## 2019-05-30 ENCOUNTER — NON-APPOINTMENT (OUTPATIENT)
Age: 54
End: 2019-05-30

## 2019-05-30 VITALS
WEIGHT: 242 LBS | DIASTOLIC BLOOD PRESSURE: 70 MMHG | OXYGEN SATURATION: 98 % | SYSTOLIC BLOOD PRESSURE: 126 MMHG | HEART RATE: 66 BPM | HEIGHT: 68 IN | BODY MASS INDEX: 36.68 KG/M2

## 2019-05-30 PROCEDURE — 99214 OFFICE O/P EST MOD 30 MIN: CPT | Mod: 25

## 2019-05-30 PROCEDURE — 93000 ELECTROCARDIOGRAM COMPLETE: CPT

## 2019-05-30 RX ORDER — ERTUGLIFLOZIN 15 MG/1
15 TABLET, FILM COATED ORAL
Refills: 0 | Status: ACTIVE | COMMUNITY

## 2019-05-30 NOTE — DISCUSSION/SUMMARY
[FreeTextEntry1] : EKG:NSR,WNL\par advised to get sleep study( gets very fatigued at 4pm)\par DM f/u with endocrinologist.\par reviewed labs done  april 11\par VU=027ou%, LDL=56\par get echo- has 3 flight BROWN\par DC smoking!!\par Cont trilipex + lipitor for lipids, weight loss\par metoprolol for CAD

## 2019-05-30 NOTE — HISTORY OF PRESENT ILLNESS
[FreeTextEntry1] : No CP,sob- gets a bit lightheaded when glucose low\par still smoking 1ppd. had muscle cramps- improved on Co Q 10

## 2019-05-30 NOTE — REVIEW OF SYSTEMS
[Feeling Fatigued] : feeling fatigued [Eyeglasses] : currently wearing eyeglasses [Loss Of Hearing] : hearing loss [Chest  Pressure] : chest pressure [Nocturia] : nocturia [see HPI] : see HPI [Joint Pain] : joint pain [Joint Stiffness] : joint stiffness [Negative] : Heme/Lymph [Fever] : no fever [Headache] : no headache [Recent Weight Gain (___ Lbs)] : no recent weight gain [Chills] : no chills [Recent Weight Loss (___ Lbs)] : no recent weight loss [Blurry Vision] : no blurred vision [Seeing Double (Diplopia)] : no diplopia [Eye Pain] : no eye pain [Sore Throat] : no sore throat [Shortness Of Breath] : no shortness of breath [Dyspnea on exertion] : not dyspnea during exertion [Lower Ext Edema] : no extremity edema [Palpitations] : no palpitations [Cough] : no cough [Wheezing] : no wheezing [Coughing Up Blood] : no hemoptysis [Hematuria] : no hematuria [Joint Swelling] : no joint swelling [Muscle Cramps] : no muscle cramps [Limb Weakness (Paresis)] : no limb weakness [Dizziness] : no dizziness [Tremor] : no tremor was seen [Numbness (Hypesthesia)] : no numbness [Convulsions] : no convulsions [Tingling (Paresthesia)] : no tingling [Confusion] : no confusion was observed [Memory Lapses Or Loss] : no memory lapses or loss [Depression] : no depression [Anxiety] : no anxiety [Under Stress] : not under stress [Suicidal] : not suicidal

## 2019-05-30 NOTE — PHYSICAL EXAM
[General Appearance - Well Developed] : well developed [Normal Appearance] : normal appearance [Well Groomed] : well groomed [General Appearance - Well Nourished] : well nourished [No Deformities] : no deformities [General Appearance - In No Acute Distress] : no acute distress [Normal Conjunctiva] : the conjunctiva exhibited no abnormalities [Normal Oral Mucosa] : normal oral mucosa [Normal Jugular Venous A Waves Present] : normal jugular venous A waves present [Normal Jugular Venous V Waves Present] : normal jugular venous V waves present [No Jugular Venous Gaitan A Waves] : no jugular venous gaitan A waves [] : no respiratory distress [Respiration, Rhythm And Depth] : normal respiratory rhythm and effort [Exaggerated Use Of Accessory Muscles For Inspiration] : no accessory muscle use [Auscultation Breath Sounds / Voice Sounds] : lungs were clear to auscultation bilaterally [Heart Rate And Rhythm] : heart rate and rhythm were normal [Heart Sounds] : normal S1 and S2 [Murmurs] : no murmurs present [Bowel Sounds] : normal bowel sounds [Abdomen Soft] : soft [Abdomen Tenderness] : non-tender [Abnormal Walk] : normal gait [Nail Clubbing] : no clubbing of the fingernails [Skin Color & Pigmentation] : normal skin color and pigmentation [Oriented To Time, Place, And Person] : oriented to person, place, and time [FreeTextEntry1] : large ventral hernia

## 2019-06-19 ENCOUNTER — APPOINTMENT (OUTPATIENT)
Dept: HEART AND VASCULAR | Facility: CLINIC | Age: 54
End: 2019-06-19

## 2019-12-12 ENCOUNTER — NON-APPOINTMENT (OUTPATIENT)
Age: 54
End: 2019-12-12

## 2019-12-12 ENCOUNTER — APPOINTMENT (OUTPATIENT)
Dept: HEART AND VASCULAR | Facility: CLINIC | Age: 54
End: 2019-12-12
Payer: MEDICAID

## 2019-12-12 VITALS
SYSTOLIC BLOOD PRESSURE: 118 MMHG | HEART RATE: 60 BPM | DIASTOLIC BLOOD PRESSURE: 74 MMHG | WEIGHT: 244 LBS | BODY MASS INDEX: 37.1 KG/M2

## 2019-12-12 DIAGNOSIS — Z01.810 ENCOUNTER FOR PREPROCEDURAL CARDIOVASCULAR EXAMINATION: ICD-10-CM

## 2019-12-12 PROCEDURE — 93000 ELECTROCARDIOGRAM COMPLETE: CPT

## 2019-12-12 PROCEDURE — 99214 OFFICE O/P EST MOD 30 MIN: CPT | Mod: 25

## 2019-12-12 NOTE — PHYSICAL EXAM
[General Appearance - Well Developed] : well developed [Normal Appearance] : normal appearance [Well Groomed] : well groomed [General Appearance - Well Nourished] : well nourished [No Deformities] : no deformities [General Appearance - In No Acute Distress] : no acute distress [Normal Oral Mucosa] : normal oral mucosa [Normal Conjunctiva] : the conjunctiva exhibited no abnormalities [Normal Jugular Venous A Waves Present] : normal jugular venous A waves present [Normal Jugular Venous V Waves Present] : normal jugular venous V waves present [No Jugular Venous Gaitan A Waves] : no jugular venous gaitan A waves [] : no respiratory distress [Respiration, Rhythm And Depth] : normal respiratory rhythm and effort [Exaggerated Use Of Accessory Muscles For Inspiration] : no accessory muscle use [Heart Rate And Rhythm] : heart rate and rhythm were normal [Heart Sounds] : normal S1 and S2 [Murmurs] : no murmurs present [Edema] : no peripheral edema present [Bowel Sounds] : normal bowel sounds [Abdomen Tenderness] : non-tender [Abdomen Soft] : soft [Abnormal Walk] : normal gait [Nail Clubbing] : no clubbing of the fingernails [Oriented To Time, Place, And Person] : oriented to person, place, and time [Skin Color & Pigmentation] : normal skin color and pigmentation [FreeTextEntry1] : Ventral hernia

## 2019-12-12 NOTE — DISCUSSION/SUMMARY
[FreeTextEntry1] : EKG:NSR\par advised to get sleep study\par had labs\par continue lipitor for lipids; as for distal CAD; DM f/u with endocrinology \par Stop smoking!\par get labs\par will need echo for BROWN( no Sx now but NO ACTIVITY)\par would get nuc stress for CAD\par reviewed labs\par TG= 497,HDL= 244- add Lovaza( states taking fenofibrate now- to DC)\par and get labs\par If no significant ischemia his cardiac status is stable is stable enough to permit Surgery

## 2019-12-12 NOTE — REVIEW OF SYSTEMS
[Eyeglasses] : currently wearing eyeglasses [Loss Of Hearing] : hearing loss [Chest  Pressure] : chest pressure [Nocturia] : nocturia [see HPI] : see HPI [Joint Pain] : joint pain [Joint Stiffness] : joint stiffness [Negative] : Endocrine [Fever] : no fever [Headache] : no headache [Recent Weight Gain (___ Lbs)] : no recent weight gain [Chills] : no chills [Recent Weight Loss (___ Lbs)] : no recent weight loss [Feeling Fatigued] : not feeling fatigued [Blurry Vision] : no blurred vision [Seeing Double (Diplopia)] : no diplopia [Eye Pain] : no eye pain [Sore Throat] : no sore throat [Shortness Of Breath] : no shortness of breath [Dyspnea on exertion] : not dyspnea during exertion [Lower Ext Edema] : no extremity edema [Palpitations] : no palpitations [Cough] : no cough [Hematuria] : no hematuria [Wheezing] : no wheezing [Coughing Up Blood] : no hemoptysis [Joint Swelling] : no joint swelling [Muscle Cramps] : no muscle cramps [Limb Weakness (Paresis)] : no limb weakness [Numbness (Hypesthesia)] : no numbness [Dizziness] : no dizziness [Tremor] : no tremor was seen [Convulsions] : no convulsions [Confusion] : no confusion was observed [Tingling (Paresthesia)] : no tingling [Memory Lapses Or Loss] : no memory lapses or loss [Depression] : no depression [Suicidal] : not suicidal [Anxiety] : no anxiety [Under Stress] : not under stress

## 2019-12-12 NOTE — HISTORY OF PRESENT ILLNESS
[FreeTextEntry1] : states no cardiac issues. no cp- no ore lazcano but mostly sedentary- still smoking- to get abdominal, hernia surgery\par Hasn't gone for sleep evaluation. has daytime somnolence\par states has cold now

## 2019-12-19 ENCOUNTER — APPOINTMENT (OUTPATIENT)
Dept: HEART AND VASCULAR | Facility: CLINIC | Age: 54
End: 2019-12-19

## 2020-01-09 ENCOUNTER — APPOINTMENT (OUTPATIENT)
Dept: HEART AND VASCULAR | Facility: CLINIC | Age: 55
End: 2020-01-09
Payer: MEDICAID

## 2020-01-09 DIAGNOSIS — R06.09 OTHER FORMS OF DYSPNEA: ICD-10-CM

## 2020-01-09 PROCEDURE — 93306 TTE W/DOPPLER COMPLETE: CPT

## 2020-01-27 PROBLEM — R06.09 DYSPNEA ON EXERTION: Status: ACTIVE | Noted: 2019-05-30

## 2020-04-12 ENCOUNTER — RX RENEWAL (OUTPATIENT)
Age: 55
End: 2020-04-12

## 2020-04-20 ENCOUNTER — RX RENEWAL (OUTPATIENT)
Age: 55
End: 2020-04-20

## 2020-06-02 NOTE — DISCHARGE NOTE ADULT - DISCHARGE DATE
07-Aug-2018 Cartilage Graft Text: The defect edges were debeveled with a #15 scalpel blade.  Given the location of the defect, shape of the defect, the fact the defect involved a full thickness cartilage defect a cartilage graft was deemed most appropriate.  An appropriate donor site was identified, cleansed, and anesthetized. The cartilage graft was then harvested and transferred to the recipient site, oriented appropriately and then sutured into place.  The secondary defect was then repaired using a primary closure.

## 2020-07-13 ENCOUNTER — RX RENEWAL (OUTPATIENT)
Age: 55
End: 2020-07-13

## 2020-08-10 ENCOUNTER — NON-APPOINTMENT (OUTPATIENT)
Age: 55
End: 2020-08-10

## 2020-08-10 ENCOUNTER — APPOINTMENT (OUTPATIENT)
Dept: HEART AND VASCULAR | Facility: CLINIC | Age: 55
End: 2020-08-10
Payer: MEDICAID

## 2020-08-10 VITALS
BODY MASS INDEX: 36.98 KG/M2 | TEMPERATURE: 97 F | HEIGHT: 68 IN | WEIGHT: 244 LBS | HEART RATE: 59 BPM | DIASTOLIC BLOOD PRESSURE: 80 MMHG | SYSTOLIC BLOOD PRESSURE: 138 MMHG

## 2020-08-10 VITALS — SYSTOLIC BLOOD PRESSURE: 130 MMHG | DIASTOLIC BLOOD PRESSURE: 80 MMHG

## 2020-08-10 PROCEDURE — 93000 ELECTROCARDIOGRAM COMPLETE: CPT

## 2020-08-10 PROCEDURE — 36415 COLL VENOUS BLD VENIPUNCTURE: CPT

## 2020-08-10 PROCEDURE — 99214 OFFICE O/P EST MOD 30 MIN: CPT | Mod: 25

## 2020-08-10 RX ORDER — ESCITALOPRAM OXALATE 20 MG/1
20 TABLET ORAL
Qty: 30 | Refills: 0 | Status: DISCONTINUED | COMMUNITY
Start: 2017-12-11 | End: 2020-08-10

## 2020-08-10 NOTE — REVIEW OF SYSTEMS
[Eyeglasses] : currently wearing eyeglasses [Loss Of Hearing] : hearing loss [Urinary Frequency] : urinary frequency [Nocturia] : nocturia [Joint Pain] : joint pain [Joint Stiffness] : joint stiffness [Negative] : Heme/Lymph [Fever] : no fever [Recent Weight Gain (___ Lbs)] : no recent weight gain [Headache] : no headache [Feeling Fatigued] : not feeling fatigued [Chills] : no chills [Recent Weight Loss (___ Lbs)] : no recent weight loss [Blurry Vision] : no blurred vision [Seeing Double (Diplopia)] : no diplopia [Eye Pain] : no eye pain [Sore Throat] : no sore throat [Shortness Of Breath] : no shortness of breath [Dyspnea on exertion] : not dyspnea during exertion [Lower Ext Edema] : no extremity edema [Chest  Pressure] : no chest pressure [Cough] : no cough [Palpitations] : no palpitations [Wheezing] : no wheezing [Coughing Up Blood] : no hemoptysis [Hematuria] : no hematuria [Joint Swelling] : no joint swelling [Muscle Cramps] : no muscle cramps [Limb Weakness (Paresis)] : no limb weakness [Dizziness] : no dizziness [Tremor] : no tremor was seen [Numbness (Hypesthesia)] : no numbness [Convulsions] : no convulsions [Confusion] : no confusion was observed [Tingling (Paresthesia)] : no tingling [Memory Lapses Or Loss] : no memory lapses or loss [Anxiety] : no anxiety [Depression] : no depression [Under Stress] : not under stress [Suicidal] : not suicidal

## 2020-08-10 NOTE — HISTORY OF PRESENT ILLNESS
[FreeTextEntry1] : still smoking- hasn't seen endocrine in awhile and asked me to check labs. no cp,sob. not following diet

## 2020-08-10 NOTE — DISCUSSION/SUMMARY
[FreeTextEntry1] : EKG:NSR,WNL. will check labs\par cont lipitor for lipids/asa + Toprol for CAD; ( cont lipitor no longer taking Fenofibrate) for lipids:DM f/u with endocrine.would place on ACE for DM/BP\par needs to stop smoking

## 2020-08-10 NOTE — PHYSICAL EXAM
[General Appearance - Well Developed] : well developed [General Appearance - Well Nourished] : well nourished [Well Groomed] : well groomed [Normal Appearance] : normal appearance [General Appearance - In No Acute Distress] : no acute distress [No Deformities] : no deformities [Normal Oral Mucosa] : normal oral mucosa [Normal Conjunctiva] : the conjunctiva exhibited no abnormalities [Normal Jugular Venous A Waves Present] : normal jugular venous A waves present [No Jugular Venous Gaitan A Waves] : no jugular venous gaitan A waves [Normal Jugular Venous V Waves Present] : normal jugular venous V waves present [Respiration, Rhythm And Depth] : normal respiratory rhythm and effort [] : no respiratory distress [Exaggerated Use Of Accessory Muscles For Inspiration] : no accessory muscle use [Edema] : no peripheral edema present [Heart Rate And Rhythm] : heart rate and rhythm were normal [Heart Sounds] : normal S1 and S2 [Murmurs] : no murmurs present [Bowel Sounds] : normal bowel sounds [Abdomen Soft] : soft [Abnormal Walk] : normal gait [Abdomen Tenderness] : non-tender [Oriented To Time, Place, And Person] : oriented to person, place, and time [Nail Clubbing] : no clubbing of the fingernails [Skin Color & Pigmentation] : normal skin color and pigmentation [FreeTextEntry1] : Ventral hernia

## 2020-08-11 LAB
ALBUMIN SERPL ELPH-MCNC: 4.5 G/DL
ALP BLD-CCNC: 61 U/L
ALT SERPL-CCNC: 19 U/L
ANION GAP SERPL CALC-SCNC: 16 MMOL/L
AST SERPL-CCNC: 16 U/L
BILIRUB SERPL-MCNC: 1.1 MG/DL
BUN SERPL-MCNC: 13 MG/DL
CALCIUM SERPL-MCNC: 9.7 MG/DL
CHLORIDE SERPL-SCNC: 102 MMOL/L
CHOLEST SERPL-MCNC: 129 MG/DL
CHOLEST/HDLC SERPL: 4.3 RATIO
CO2 SERPL-SCNC: 22 MMOL/L
CREAT SERPL-MCNC: 0.73 MG/DL
ESTIMATED AVERAGE GLUCOSE: 220 MG/DL
GLUCOSE SERPL-MCNC: 139 MG/DL
HBA1C MFR BLD HPLC: 9.3 %
HDLC SERPL-MCNC: 30 MG/DL
LDLC SERPL CALC-MCNC: 57 MG/DL
POTASSIUM SERPL-SCNC: 4.1 MMOL/L
PROT SERPL-MCNC: 6.7 G/DL
SODIUM SERPL-SCNC: 140 MMOL/L
TRIGL SERPL-MCNC: 209 MG/DL

## 2020-09-08 ENCOUNTER — RX RENEWAL (OUTPATIENT)
Age: 55
End: 2020-09-08

## 2020-10-21 ENCOUNTER — APPOINTMENT (OUTPATIENT)
Dept: BARIATRICS | Facility: CLINIC | Age: 55
End: 2020-10-21
Payer: MEDICAID

## 2020-10-21 VITALS
DIASTOLIC BLOOD PRESSURE: 78 MMHG | WEIGHT: 243.5 LBS | HEIGHT: 68 IN | SYSTOLIC BLOOD PRESSURE: 127 MMHG | BODY MASS INDEX: 36.9 KG/M2 | TEMPERATURE: 96.9 F | OXYGEN SATURATION: 97 % | HEART RATE: 67 BPM

## 2020-10-21 PROCEDURE — 99072 ADDL SUPL MATRL&STAF TM PHE: CPT

## 2020-10-21 PROCEDURE — 99203 OFFICE O/P NEW LOW 30 MIN: CPT

## 2020-10-21 NOTE — ADDENDUM
[FreeTextEntry1] : This note was written by Sindhu Black on 08/19/2020 acting as scribe for Dr. Moon.

## 2020-10-21 NOTE — END OF VISIT
[FreeTextEntry3] : All medical record entries made by the Scribe were at my, Dr. Moon's, discretion and personally dictated by me on 08/19/2020. I have reviewed the chart and agree that the record accurately reflects my personal performance of the history, physical exam, assessment and plan. I have also personally directed, reviewed and agreed to the chart.

## 2020-10-21 NOTE — ASSESSMENT
[FreeTextEntry1] : Will get EGD and abdominal CT with IV and PO contrast then have him return to decide the risks, benefits of all alternatives.

## 2020-10-21 NOTE — PHYSICAL EXAM
[Obese] : obese [Normal] : affect appropriate [de-identified] : +giant, chronically incarcerated midline hernia +midline incision +general breakdown of the abdominal wall

## 2020-10-21 NOTE — HISTORY OF PRESENT ILLNESS
[de-identified] : Elvia Lopez is referred by Dr. Tamayo. He is a very medically complicated 56 y/o M who has a complicated bariatric history as well as poorly controlled DMII for which is he on oral medication as well as insulin therapy. Despite this, his hemoglobin A1C is 9.3. He has glaucoma but denies retinopathy, last eye exam 4 weeks ago. Denies CKD and nephropathy. According to Dr. Tamayo, at present has non critical coronary artery disease. From a bariatric standpoint, he had multiple revisions of lap band surgery and had an emergent small bowel obstruction where the band was removed. Following this he was converted to sleeve gastrectomy at [facility]. Currently sounds like he can eat 50% of a normal portion. On exam he has a giant chronically incarcerated hernia in his midline as well as a midline incision extending above and beneath this, and general breakdown of the abdominal wall. Elvia presents a very challenging situation. He has uncontrolled DMII and could benefit from revision adding a large intestinal bypass. However, this would be a complex procedure because of his prior surgery and the hernia. iI will get a CT scan and EGD to look at the hernia and the quality of the sleeve gastrectomy. Options may include just converting to a DS type procedure or in the strange situation were in, a sassi-type procedure and see if giving BP limb will help his DM. Management of the hernia would be complex, potentially could close the skin, allowing him to heal with a hernia and deal with this at a later time or alternatively could place mesh at the same setting, which carries additional risk. Will get EGD and abdominal CT with IV and PO contrast then have him return to decide the risks, benefits of all alternatives.

## 2020-11-02 ENCOUNTER — RX RENEWAL (OUTPATIENT)
Age: 55
End: 2020-11-02

## 2020-11-18 ENCOUNTER — NON-APPOINTMENT (OUTPATIENT)
Age: 55
End: 2020-11-18

## 2020-12-01 ENCOUNTER — RESULT REVIEW (OUTPATIENT)
Age: 55
End: 2020-12-01

## 2020-12-01 ENCOUNTER — OUTPATIENT (OUTPATIENT)
Dept: OUTPATIENT SERVICES | Facility: HOSPITAL | Age: 55
LOS: 1 days | End: 2020-12-01
Payer: COMMERCIAL

## 2020-12-01 ENCOUNTER — APPOINTMENT (OUTPATIENT)
Dept: CT IMAGING | Facility: HOSPITAL | Age: 55
End: 2020-12-01
Payer: MEDICAID

## 2020-12-01 DIAGNOSIS — Z90.3 ACQUIRED ABSENCE OF STOMACH [PART OF]: Chronic | ICD-10-CM

## 2020-12-01 PROCEDURE — 74177 CT ABD & PELVIS W/CONTRAST: CPT | Mod: 26

## 2020-12-01 PROCEDURE — 74177 CT ABD & PELVIS W/CONTRAST: CPT

## 2020-12-23 ENCOUNTER — RX RENEWAL (OUTPATIENT)
Age: 55
End: 2020-12-23

## 2021-01-19 ENCOUNTER — APPOINTMENT (OUTPATIENT)
Dept: GASTROENTEROLOGY | Facility: CLINIC | Age: 56
End: 2021-01-19
Payer: MEDICAID

## 2021-01-19 VITALS
WEIGHT: 243 LBS | OXYGEN SATURATION: 97 % | HEART RATE: 62 BPM | DIASTOLIC BLOOD PRESSURE: 70 MMHG | TEMPERATURE: 97.2 F | SYSTOLIC BLOOD PRESSURE: 110 MMHG | RESPIRATION RATE: 14 BRPM | BODY MASS INDEX: 36.83 KG/M2 | HEIGHT: 68 IN

## 2021-01-19 DIAGNOSIS — Z12.12 ENCOUNTER FOR SCREENING FOR MALIGNANT NEOPLASM OF COLON: ICD-10-CM

## 2021-01-19 DIAGNOSIS — Z12.11 ENCOUNTER FOR SCREENING FOR MALIGNANT NEOPLASM OF COLON: ICD-10-CM

## 2021-01-19 PROCEDURE — 99203 OFFICE O/P NEW LOW 30 MIN: CPT

## 2021-01-19 PROCEDURE — 99072 ADDL SUPL MATRL&STAF TM PHE: CPT

## 2021-01-19 NOTE — PHYSICAL EXAM
[General Appearance - Alert] : alert [General Appearance - In No Acute Distress] : in no acute distress [General Appearance - Well Nourished] : well nourished [General Appearance - Well Developed] : well developed [PERRL With Normal Accommodation] : pupils were equal in size, round, and reactive to light [Extraocular Movements] : extraocular movements were intact [Hearing Threshold Finger Rub Not [place]] : hearing was normal [Examination Of The Oral Cavity] : the lips and gums were normal [Neck Cervical Mass (___cm)] : no neck mass was observed [] : no respiratory distress [Respiration, Rhythm And Depth] : normal respiratory rhythm and effort [Exaggerated Use Of Accessory Muscles For Inspiration] : no accessory muscle use [Heart Rate And Rhythm] : heart rate was normal and rhythm regular [Involuntary Movements] : no involuntary movements were seen [Skin Color & Pigmentation] : normal skin color and pigmentation [Skin Turgor] : normal skin turgor [No Focal Deficits] : no focal deficits [Oriented To Time, Place, And Person] : oriented to person, place, and time [Impaired Insight] : insight and judgment were intact [FreeTextEntry1] : Obese, protruding hernia under midline incision, non-tender

## 2021-01-19 NOTE — HISTORY OF PRESENT ILLNESS
[de-identified] : Patient is a 55yM referred by Dr. Moon for preoperative EGD prior to revision of bariatric surgery. Patient has a history of poorly controlled DM for which he is on several agents, as well as CAD. He has a complicated surgical history - patient initially had a lap band (2012) which was removed emergently during an episode of small bowel obstruction (2014). After removal of the lab band, he underwent a sleeve gastrectomy at [facility] (2015). Patient with a chronically incarcerated hernia within the midline abdominal incision with some skin breakdown that causes him occasional discomfort. Patient is scheduled to see his Cardiologist again on 2/1/2021. Denies reflux or abdominal pain, endorses some early satiety. Per patient and notes from Dr. Moon, he is planned for either a duodenal switch or SASI bypass. Patient endorses history of colonoscopy in 2015 after which he believes he was told to follow-up in 5 years, does not recall specific findings.

## 2021-01-19 NOTE — END OF VISIT
[] : Fellow [FreeTextEntry3] : Pt seen and examined, d/w Dr. Patel.  Will plan for an EGD for pre-bariatric surgery evaluation as well as a f/u colonoscopy at [facility].

## 2021-01-20 LAB
ALBUMIN SERPL ELPH-MCNC: 4.5 G/DL
ALP BLD-CCNC: 59 U/L
ALT SERPL-CCNC: 19 U/L
ANION GAP SERPL CALC-SCNC: 14 MMOL/L
AST SERPL-CCNC: 15 U/L
BASOPHILS # BLD AUTO: 0.05 K/UL
BASOPHILS NFR BLD AUTO: 0.5 %
BILIRUB SERPL-MCNC: 1 MG/DL
BUN SERPL-MCNC: 17 MG/DL
CALCIUM SERPL-MCNC: 9.8 MG/DL
CHLORIDE SERPL-SCNC: 103 MMOL/L
CO2 SERPL-SCNC: 23 MMOL/L
CREAT SERPL-MCNC: 0.78 MG/DL
EOSINOPHIL # BLD AUTO: 0.16 K/UL
EOSINOPHIL NFR BLD AUTO: 1.6 %
GLUCOSE SERPL-MCNC: 169 MG/DL
HCT VFR BLD CALC: 53.3 %
HGB BLD-MCNC: 17.6 G/DL
IMM GRANULOCYTES NFR BLD AUTO: 0.4 %
INR PPP: 0.96 RATIO
LYMPHOCYTES # BLD AUTO: 2.95 K/UL
LYMPHOCYTES NFR BLD AUTO: 30.2 %
MAN DIFF?: NORMAL
MCHC RBC-ENTMCNC: 29.8 PG
MCHC RBC-ENTMCNC: 33 GM/DL
MCV RBC AUTO: 90.3 FL
MONOCYTES # BLD AUTO: 0.55 K/UL
MONOCYTES NFR BLD AUTO: 5.6 %
NEUTROPHILS # BLD AUTO: 6.03 K/UL
NEUTROPHILS NFR BLD AUTO: 61.7 %
PLATELET # BLD AUTO: 262 K/UL
POTASSIUM SERPL-SCNC: 4.3 MMOL/L
PROT SERPL-MCNC: 6.9 G/DL
PT BLD: 11.4 SEC
RBC # BLD: 5.9 M/UL
RBC # FLD: 13.3 %
SODIUM SERPL-SCNC: 140 MMOL/L
WBC # FLD AUTO: 9.78 K/UL

## 2021-01-22 LAB
ESTIMATED AVERAGE GLUCOSE: 209 MG/DL
HBA1C MFR BLD HPLC: 8.9 %

## 2021-01-25 ENCOUNTER — RX RENEWAL (OUTPATIENT)
Age: 56
End: 2021-01-25

## 2021-02-22 ENCOUNTER — RX RENEWAL (OUTPATIENT)
Age: 56
End: 2021-02-22

## 2021-02-25 ENCOUNTER — APPOINTMENT (OUTPATIENT)
Dept: HEART AND VASCULAR | Facility: CLINIC | Age: 56
End: 2021-02-25
Payer: MEDICAID

## 2021-02-25 ENCOUNTER — NON-APPOINTMENT (OUTPATIENT)
Age: 56
End: 2021-02-25

## 2021-02-25 VITALS
HEIGHT: 68 IN | BODY MASS INDEX: 36.22 KG/M2 | DIASTOLIC BLOOD PRESSURE: 80 MMHG | TEMPERATURE: 98.1 F | SYSTOLIC BLOOD PRESSURE: 120 MMHG | HEART RATE: 58 BPM | WEIGHT: 239 LBS

## 2021-02-25 PROCEDURE — 99214 OFFICE O/P EST MOD 30 MIN: CPT | Mod: 25

## 2021-02-25 PROCEDURE — 93000 ELECTROCARDIOGRAM COMPLETE: CPT

## 2021-02-25 PROCEDURE — 99072 ADDL SUPL MATRL&STAF TM PHE: CPT

## 2021-02-25 PROCEDURE — 93306 TTE W/DOPPLER COMPLETE: CPT

## 2021-02-25 NOTE — PHYSICAL EXAM
[General Appearance - Well Developed] : well developed [Normal Appearance] : normal appearance [Well Groomed] : well groomed [General Appearance - Well Nourished] : well nourished [No Deformities] : no deformities [General Appearance - In No Acute Distress] : no acute distress [Normal Conjunctiva] : the conjunctiva exhibited no abnormalities [Normal Oral Mucosa] : normal oral mucosa [Normal Jugular Venous A Waves Present] : normal jugular venous A waves present [Normal Jugular Venous V Waves Present] : normal jugular venous V waves present [No Jugular Venous Gaitan A Waves] : no jugular venous gaitan A waves [] : no respiratory distress [Respiration, Rhythm And Depth] : normal respiratory rhythm and effort [Exaggerated Use Of Accessory Muscles For Inspiration] : no accessory muscle use [Auscultation Breath Sounds / Voice Sounds] : lungs were clear to auscultation bilaterally [Heart Rate And Rhythm] : heart rate and rhythm were normal [Heart Sounds] : normal S1 and S2 [Murmurs] : no murmurs present [Edema] : no peripheral edema present [Bowel Sounds] : normal bowel sounds [Abdomen Soft] : soft [Abdomen Tenderness] : non-tender [Abnormal Walk] : normal gait [Nail Clubbing] : no clubbing of the fingernails [Skin Color & Pigmentation] : normal skin color and pigmentation [Oriented To Time, Place, And Person] : oriented to person, place, and time [FreeTextEntry1] : Ventral hernia

## 2021-02-25 NOTE — DISCUSSION/SUMMARY
[FreeTextEntry1] : EKG:NSR,LVH\par ECHO:normal LVEf,basal septal hypertrophy,min MR/TR\par discussed need to stop smoking\par  I called Dr Dukes's office about elevated a1c(pt was concerned) and will fax labs to them\par continue asa + Metoprolol for CAD;lovazza +lipitor for lipids; zestril for hptn. reviewed recent labs with pt- LDL at goal but HDL low- exercise/weight loss\par meds renewed

## 2021-04-29 ENCOUNTER — APPOINTMENT (OUTPATIENT)
Dept: HEART AND VASCULAR | Facility: CLINIC | Age: 56
End: 2021-04-29
Payer: MEDICAID

## 2021-04-29 ENCOUNTER — NON-APPOINTMENT (OUTPATIENT)
Age: 56
End: 2021-04-29

## 2021-04-29 VITALS
WEIGHT: 240 LBS | BODY MASS INDEX: 36.37 KG/M2 | DIASTOLIC BLOOD PRESSURE: 80 MMHG | SYSTOLIC BLOOD PRESSURE: 128 MMHG | TEMPERATURE: 97.6 F | HEIGHT: 68 IN | HEART RATE: 60 BPM

## 2021-04-29 PROCEDURE — 99406 BEHAV CHNG SMOKING 3-10 MIN: CPT

## 2021-04-29 PROCEDURE — 99214 OFFICE O/P EST MOD 30 MIN: CPT | Mod: 25

## 2021-04-29 PROCEDURE — 93000 ELECTROCARDIOGRAM COMPLETE: CPT

## 2021-04-29 PROCEDURE — 99072 ADDL SUPL MATRL&STAF TM PHE: CPT

## 2021-04-29 NOTE — HISTORY OF PRESENT ILLNESS
[FreeTextEntry1] : no cp,sob- reports problems with his feet ( stiffness,etc)- had negative " circulation problems"\par has daytime sleepiness.\par still smoking

## 2021-04-29 NOTE — PHYSICAL EXAM
[Well Developed] : well developed [Well Nourished] : well nourished [No Acute Distress] : no acute distress [Normal Conjunctiva] : normal conjunctiva [Normal Venous Pressure] : normal venous pressure [No Carotid Bruit] : no carotid bruit [Normal S1, S2] : normal S1, S2 [No Murmur] : no murmur [Clear Lung Fields] : clear lung fields [Good Air Entry] : good air entry [No Respiratory Distress] : no respiratory distress  [Soft] : abdomen soft [Non Tender] : non-tender [Normal Bowel Sounds] : normal bowel sounds [Normal Gait] : normal gait [No Edema] : no edema [No Cyanosis] : no cyanosis [Venous varicosities] : venous varicosities [No Rash] : no rash [No Skin Lesions] : no skin lesions [Moves all extremities] : moves all extremities [Normal Speech] : normal speech [No Focal Deficits] : no focal deficits [Alert and Oriented] : alert and oriented [Normal memory] : normal memory [de-identified] : large ventral hernia

## 2021-04-29 NOTE — DISCUSSION/SUMMARY
[FreeTextEntry1] : EKG:NSR\par counseled re: smoking cessation\par advised needs DM control and to f/u with endocrine\par cont asa +Toprol for cad; Zestril for HPTN; Lipitor for lipids- he will be getting labs soon- i suspect that his leg issues are due to DM or L/S disease and advised PCP f/u. advised to get sleep study for daytime sleepiness\par meds refilled

## 2021-04-29 NOTE — REVIEW OF SYSTEMS
[Hearing Loss] : hearing loss [Cough] : cough [Wheezing] : wheezing [Negative] : Heme/Lymph [Abdominal Pain] : no abdominal pain [Nausea] : no nausea [Vomiting] : no vomiting [Change in Appetite] : no change in appetite [Change In The Stool] : no change in stool [Dysphagia] : no dysphagia [Constipation] : constipation

## 2021-06-22 ENCOUNTER — APPOINTMENT (OUTPATIENT)
Dept: PULMONOLOGY | Facility: CLINIC | Age: 56
End: 2021-06-22
Payer: MEDICAID

## 2021-06-22 VITALS
TEMPERATURE: 97.6 F | OXYGEN SATURATION: 97 % | HEIGHT: 68 IN | SYSTOLIC BLOOD PRESSURE: 137 MMHG | BODY MASS INDEX: 36.98 KG/M2 | HEART RATE: 66 BPM | DIASTOLIC BLOOD PRESSURE: 80 MMHG | WEIGHT: 244 LBS

## 2021-06-22 PROCEDURE — 99203 OFFICE O/P NEW LOW 30 MIN: CPT

## 2021-06-23 ENCOUNTER — APPOINTMENT (OUTPATIENT)
Dept: PULMONOLOGY | Facility: CLINIC | Age: 56
End: 2021-06-23
Payer: MEDICAID

## 2021-06-23 VITALS — WEIGHT: 244 LBS | BODY MASS INDEX: 36.98 KG/M2 | HEIGHT: 68 IN

## 2021-06-23 PROCEDURE — G0296 VISIT TO DETERM LDCT ELIG: CPT

## 2021-06-23 NOTE — PLAN
[Smoking Cessation Guidance Provided] : Smoking cessation guidance was provided to patient [[facility]] : referred to [facility] [phone] [Smoking Cessation] : smoking cessation [FreeTextEntry1] : Plan:\par -Low Dose CT chest for lung cancer screening\par -Follow up with patient and his referring provider after his LDCT results have been reviewed by the multi-disciplinary clinical team\par -Encouraged smoking cessation\par -Referral to CTC\par \par Should I Screen? tool utilized. 6 year risk of lung cancer is 1%. Patient wishes to proceed with screening.\par \par Engaged in shared decision making with . SNEHAL HERNANDES . Answered all questions. He verbalized understanding and agreement. He knows to call back with any questions or concerns

## 2021-06-23 NOTE — PHYSICAL EXAM
[General Appearance - Well Developed] : well developed [Normal Appearance] : normal appearance [General Appearance - Well Nourished] : well nourished [Normal Conjunctiva] : the conjunctiva exhibited no abnormalities [III] : III [Neck Appearance] : the appearance of the neck was normal [Heart Rate And Rhythm] : heart rate was normal and rhythm regular [Heart Sounds] : normal S1 and S2 [] : no respiratory distress [Respiration, Rhythm And Depth] : normal respiratory rhythm and effort [Auscultation Breath Sounds / Voice Sounds] : lungs were clear to auscultation bilaterally [Abnormal Walk] : normal gait [Nail Clubbing] : no clubbing of the fingernails [Cyanosis, Localized] : no localized cyanosis [Skin Color & Pigmentation] : normal skin color and pigmentation [Skin Turgor] : normal skin turgor [No Focal Deficits] : no focal deficits [Oriented To Time, Place, And Person] : oriented to person, place, and time [Impaired Insight] : insight and judgment were intact [Involuntary Movements] : no involuntary movements were seen

## 2021-06-23 NOTE — REASON FOR VISIT
[Home] : at home, [unfilled] , at the time of the visit. [Medical Office: ([facility])___] : at the medical office located in  [Verbal consent obtained from patient] : the patient, [unfilled] [Initial Evaluation] : an initial evaluation visit [Review of Eligibility] : review of eligibility [Low-Dose CT Screening Discussion] : low-dose CT lung cancer screening discussion

## 2021-06-23 NOTE — HISTORY OF PRESENT ILLNESS
[Snoring] : snoring [Frequent Nocturnal Awakening] : frequent nocturnal awakening [Daytime Somnolence] : daytime somnolence [To Bed: ___] : ~he/she~ goes to bed at [unfilled] [Arises: ___] : arises at [unfilled] [Sleep Onset Latency: ___ minutes] : sleep onset latency of [unfilled] minutes reported [Nocturnal Awakenings: ___] : ~he/she~ typically has [unfilled] nocturnal awakenings [TST: ___] : Total sleep time is [unfilled] [Daytime Sleep: ___] : daytime sleep: [unfilled] [FreeTextEntry1] : Referred by cardiologist Dr. 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 Patient says he was told he had MORALES about 20yrs ago and was given CPAP but never really used it because was too loud and uncomfortable, plus his symptoms improved following his initial bariatric surgery and weight loss. For past couple years he has had worsening EDS sx and poor quality sleep and was suggested that he be re-evaluated for MORALES. Currently smokes 1ppd since age 17; has tried most NRT and oral cessation therapies without success and remains interested in quitting. Also expresses interest in lung CA screening. [ESS] : 11

## 2021-06-23 NOTE — HISTORY OF PRESENT ILLNESS
[Current] : current smoker [_____ pack-years] : [unfilled] pack-years [TextBox_13] : Referred by Dr. Gutierres\par \par Mr. SNEHAL HERNANDES  is a 55 year old man with a history of CAD, MORALES and nicotine dependence\par \par He  was seen in the office by Dr. Gutierres  for review of eligibility for, as well as, discussion of Low-Dose CT lung cancer screening program. Over the telephone today we reviewed and confirmed that the patient meets screening eligibility criteria:\par -Age: 55 year \par Smoking status:\par -Current smoker\par -Number of pack(s) per day: 1\par -Number of years smoked: 38\par -Number of pack years smokin\par \par He is interested in quitting using NRT.\par \par Mr. HERNANDES denies any signs or symptoms of lung cancer including new cough, change in cough, hemoptysis and unintentional weight loss. \par \par Mr. HERNANDES denies any personal history of lung cancer. No lung cancer in a 1st degree relative. Denies any history of lung disease. Denies any history of occupational exposures.\par \par \par  [TextBox_6] : 1 [TextBox_8] : 38

## 2021-06-23 NOTE — REVIEW OF SYSTEMS
[EDS: ESS=____] : daytime somnolence: ESS=[unfilled] [Snoring] : snoring [Obesity] : obesity [Diabetes] : diabetes  [A.M. Headache] : headache present upon awakening [Difficulty Initiating Sleep] : difficulty falling asleep [Negative] : Psychiatric [Unusual Sleep Behavior] : no unusual sleep behavior [Hypersomnolence] : not sleeping much more than usual [Cataplexy] :  no cataplexy

## 2021-06-23 NOTE — ASSESSMENT
[Discussed Risks and Advised to Quit Smoking] : Discussed risks and advised to quit smoking [Ready] : Patient is ready for cessation intervention [Preparation] : Preparation: The patient is getting ready to quit smoking

## 2021-06-23 NOTE — CONSULT LETTER
[Dear  ___] : Dear  [unfilled], [Consult Letter:] : I had the pleasure of evaluating your patient, [unfilled]. [Please see my note below.] : Please see my note below. [Consult Closing:] : Thank you very much for allowing me to participate in the care of this patient.  If you have any questions, please do not hesitate to contact me. [Sincerely,] : Sincerely, [FreeTextEntry3] : Anabelle Gutierres MD\par \par [employer] [facility]\par Pulmonary, Critical Care, and Sleep Medicine\par

## 2021-06-23 NOTE — ASSESSMENT
[FreeTextEntry1] : 54yo man current ppd smoker since age 17 with PMH HTN, DM2, obesity (refractory to lap band bariatric SX and revision, 2016), CAD, and ?MORALES who presents with complaint of increasing daytime somnolence, especially around 2-3pm. Referred by Dr. 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 Once approved will have HST delivered to pts home and will receive update from office regarding next steps. Referred to the [facility]. \par \par #Current ppd smoker - interested in quitting\par -Counseled on tobacco cessation for 11 minutes and patient expressed interest in CTC service for smoking cessation; referral placed today and to have CTC reach out to pt\par -Also made referral to [facility] Lung CA screening program and will have  contact patient to set up LDCT screening test given his risk factors\par \par To follow-up following HST result\par \par Patient s/e/d with Dr. Gutierres

## 2021-06-30 ENCOUNTER — APPOINTMENT (OUTPATIENT)
Dept: CT IMAGING | Facility: HOSPITAL | Age: 56
End: 2021-06-30

## 2021-06-30 ENCOUNTER — RESULT REVIEW (OUTPATIENT)
Age: 56
End: 2021-06-30

## 2021-06-30 ENCOUNTER — OUTPATIENT (OUTPATIENT)
Dept: OUTPATIENT SERVICES | Facility: HOSPITAL | Age: 56
LOS: 1 days | End: 2021-06-30
Payer: COMMERCIAL

## 2021-06-30 DIAGNOSIS — Z90.3 ACQUIRED ABSENCE OF STOMACH [PART OF]: Chronic | ICD-10-CM

## 2021-06-30 PROCEDURE — 71271 CT THORAX LUNG CANCER SCR C-: CPT

## 2021-06-30 PROCEDURE — 71271 CT THORAX LUNG CANCER SCR C-: CPT | Mod: 26

## 2021-07-01 ENCOUNTER — NON-APPOINTMENT (OUTPATIENT)
Age: 56
End: 2021-07-01

## 2021-07-07 ENCOUNTER — RX RENEWAL (OUTPATIENT)
Age: 56
End: 2021-07-07

## 2021-07-21 ENCOUNTER — APPOINTMENT (OUTPATIENT)
Dept: SLEEP CENTER | Facility: HOME HEALTH | Age: 56
End: 2021-07-21
Payer: MEDICAID

## 2021-07-21 ENCOUNTER — OUTPATIENT (OUTPATIENT)
Dept: OUTPATIENT SERVICES | Facility: HOSPITAL | Age: 56
LOS: 1 days | End: 2021-07-21
Payer: COMMERCIAL

## 2021-07-21 DIAGNOSIS — Z90.3 ACQUIRED ABSENCE OF STOMACH [PART OF]: Chronic | ICD-10-CM

## 2021-07-21 PROCEDURE — 95800 SLP STDY UNATTENDED: CPT | Mod: 26

## 2021-07-21 PROCEDURE — 95800 SLP STDY UNATTENDED: CPT

## 2021-07-22 DIAGNOSIS — G47.33 OBSTRUCTIVE SLEEP APNEA (ADULT) (PEDIATRIC): ICD-10-CM

## 2021-07-27 ENCOUNTER — APPOINTMENT (OUTPATIENT)
Dept: PULMONOLOGY | Facility: CLINIC | Age: 56
End: 2021-07-27
Payer: MEDICAID

## 2021-07-27 VITALS
BODY MASS INDEX: 36.37 KG/M2 | HEIGHT: 68 IN | HEART RATE: 61 BPM | TEMPERATURE: 97.9 F | WEIGHT: 240 LBS | DIASTOLIC BLOOD PRESSURE: 75 MMHG | OXYGEN SATURATION: 96 % | SYSTOLIC BLOOD PRESSURE: 122 MMHG

## 2021-07-27 DIAGNOSIS — R40.0 SOMNOLENCE: ICD-10-CM

## 2021-07-27 PROCEDURE — 99214 OFFICE O/P EST MOD 30 MIN: CPT

## 2021-07-27 NOTE — REVIEW OF SYSTEMS
[Fatigue] : fatigue [Snoring] : snoring [Witnessed Apneas] : witnessed apnea [Obesity] : obesity [Difficulty Maintaining Sleep] : difficulty maintaining sleep [EDS: ESS=____] : no daytime somnolence [Nasal Congestion] : no nasal congestion [Shortness Of Breath] : no shortness of breath [Chest Pain] : no chest pain [Anemia] : no anemia [A.M. Headache] : no headache present upon awakening [Sleep Paralysis] : no sleep paralysis [Heartburn] : no heartburn [Nocturia] : no nocturia [Arthralgias] : no arthralgias [Depression] : no depression [Difficulty Initiating Sleep] : no difficulty falling asleep [Unusual Sleep Behavior] : no unusual sleep behavior [Hypersomnolence] : not sleeping much more than usual [Cataplexy] :  no cataplexy

## 2021-07-27 NOTE — ASSESSMENT
[FreeTextEntry1] : REVIEWED\par LDCT 6/30/2021\par *RUL and BI mucus plugging\par *Lingular tubular nodule\par *incidental: ventral hernia\par Lung RADS 2S: follow up CT in 12 months\par \par HST 7/2021: AHI 14.7; T88 0.1; nickie O2 saturation of 82%\par \par 56yo man current ppd smoker since age 17 with PMH HTN, DM2, obesity (refractory to lap band bariatric sx and revision, 2016), CAD following up for HST results. Also was enrolled in the lung cancer screening program and had his first LDCT; lung RADS 2S; follow up in 1 year. Results were previously discussed with the patient. Of note, incidental finding of hernia on the LDCT; he was given some surgeon recommendations.\par \par  HST shows mild MORALES with AHI of 14.7 but most likely more severe as home apnea testing underestimates the severity of MORALES. The patient is symptomatic and according to current practice guidelines treatment for mild MORALES is only indicated for symptom management. The benefits of MORALES treatment in improving cardiac, neurologic, cognitive, and mortality outcomes have not been substantiated by research when mild in severity. PAP and mandibular advancement therapy were discussed in detail. Would like to pursue PAP therapy. Equipment ordered; DME is [employer]; will have mask fitting at time of delivery; APAP set 5 to 20 cm H2O; ResMed Airsense 10. Adherence goal is at least 4 hours of use per night on 70% of nights with an AHI of less than 10 events per hour. The ramifications of MORALES and its treatment were discussed in detail. Follow up within 6 weeks of use or sooner if needed.\par

## 2021-07-27 NOTE — HISTORY OF PRESENT ILLNESS
[FreeTextEntry1] : \par 56yo man current ppd smoker since age 17 with PMH HTN, DM2, obesity (refractory to lap band bariatric SX and revision, 2016), CAD, and ?MORALES who presents with complaint of increasing daytime somnolence, especially around 2-3pm. Patient says he was told he had MORALES about 20yrs ago and was given CPAP but never really used it because was too loud and uncomfortable, plus his symptoms improved following his initial bariatric surgery and weight loss. For past couple years he has had worsening EDS sx and poor quality sleep and was suggested that he be re-evaluated for MORALES. Currently smokes 1ppd since age 17; has tried most NRT and oral cessation therapies without success and remains interested in quitting. Also expresses interest in lung CA screening. \par \par \par 7/27/21\par Patient presents for follow up on HST results. States that he is doing well with no complaints. Still feeling fatigue and having daytime somnolence.

## 2021-07-27 NOTE — PHYSICAL EXAM
[General Appearance - Well Nourished] : well nourished [General Appearance - Well Developed] : well developed [Neck Appearance] : the appearance of the neck was normal [Heart Rate And Rhythm] : heart rate was normal and rhythm regular [Heart Sounds] : normal S1 and S2 [Respiration, Rhythm And Depth] : normal respiratory rhythm and effort [Auscultation Breath Sounds / Voice Sounds] : lungs were clear to auscultation bilaterally [Abnormal Walk] : normal gait [Nail Clubbing] : no clubbing of the fingernails [Cyanosis, Localized] : no localized cyanosis [Skin Color & Pigmentation] : normal skin color and pigmentation [Oriented To Time, Place, And Person] : oriented to person, place, and time [Impaired Insight] : insight and judgment were intact [Affect] : the affect was normal [Involuntary Movements] : no involuntary movements were seen [No Focal Deficits] : no focal deficits [III] : III

## 2021-08-03 ENCOUNTER — EMERGENCY (EMERGENCY)
Facility: HOSPITAL | Age: 56
LOS: 1 days | Discharge: HOME | End: 2021-08-03
Attending: EMERGENCY MEDICINE | Admitting: EMERGENCY MEDICINE
Payer: COMMERCIAL

## 2021-08-03 VITALS
HEIGHT: 68 IN | HEART RATE: 56 BPM | TEMPERATURE: 99 F | WEIGHT: 240.08 LBS | DIASTOLIC BLOOD PRESSURE: 86 MMHG | OXYGEN SATURATION: 98 % | SYSTOLIC BLOOD PRESSURE: 134 MMHG | RESPIRATION RATE: 16 BRPM

## 2021-08-03 VITALS
HEART RATE: 52 BPM | SYSTOLIC BLOOD PRESSURE: 128 MMHG | OXYGEN SATURATION: 97 % | RESPIRATION RATE: 18 BRPM | DIASTOLIC BLOOD PRESSURE: 76 MMHG | TEMPERATURE: 98 F

## 2021-08-03 DIAGNOSIS — R00.1 BRADYCARDIA, UNSPECIFIED: ICD-10-CM

## 2021-08-03 DIAGNOSIS — R51.9 HEADACHE, UNSPECIFIED: ICD-10-CM

## 2021-08-03 DIAGNOSIS — H53.8 OTHER VISUAL DISTURBANCES: ICD-10-CM

## 2021-08-03 DIAGNOSIS — E11.9 TYPE 2 DIABETES MELLITUS WITHOUT COMPLICATIONS: ICD-10-CM

## 2021-08-03 DIAGNOSIS — Z20.822 CONTACT WITH AND (SUSPECTED) EXPOSURE TO COVID-19: ICD-10-CM

## 2021-08-03 DIAGNOSIS — Z88.0 ALLERGY STATUS TO PENICILLIN: ICD-10-CM

## 2021-08-03 DIAGNOSIS — I48.91 UNSPECIFIED ATRIAL FIBRILLATION: ICD-10-CM

## 2021-08-03 DIAGNOSIS — Z90.3 ACQUIRED ABSENCE OF STOMACH [PART OF]: Chronic | ICD-10-CM

## 2021-08-03 DIAGNOSIS — I10 ESSENTIAL (PRIMARY) HYPERTENSION: ICD-10-CM

## 2021-08-03 DIAGNOSIS — Z79.84 LONG TERM (CURRENT) USE OF ORAL HYPOGLYCEMIC DRUGS: ICD-10-CM

## 2021-08-03 DIAGNOSIS — Z88.1 ALLERGY STATUS TO OTHER ANTIBIOTIC AGENTS STATUS: ICD-10-CM

## 2021-08-03 LAB
ALBUMIN SERPL ELPH-MCNC: 4.6 G/DL — SIGNIFICANT CHANGE UP (ref 3.3–5)
ALP SERPL-CCNC: 61 U/L — SIGNIFICANT CHANGE UP (ref 40–120)
ALT FLD-CCNC: 22 U/L — SIGNIFICANT CHANGE UP (ref 10–45)
ANION GAP SERPL CALC-SCNC: 8 MMOL/L — SIGNIFICANT CHANGE UP (ref 5–17)
AST SERPL-CCNC: 17 U/L — SIGNIFICANT CHANGE UP (ref 10–40)
BASOPHILS # BLD AUTO: 0.06 K/UL — SIGNIFICANT CHANGE UP (ref 0–0.2)
BASOPHILS NFR BLD AUTO: 0.6 % — SIGNIFICANT CHANGE UP (ref 0–2)
BILIRUB SERPL-MCNC: 1 MG/DL — SIGNIFICANT CHANGE UP (ref 0.2–1.2)
BUN SERPL-MCNC: 16 MG/DL — SIGNIFICANT CHANGE UP (ref 7–23)
CALCIUM SERPL-MCNC: 9.8 MG/DL — SIGNIFICANT CHANGE UP (ref 8.4–10.5)
CHLORIDE SERPL-SCNC: 104 MMOL/L — SIGNIFICANT CHANGE UP (ref 96–108)
CO2 SERPL-SCNC: 29 MMOL/L — SIGNIFICANT CHANGE UP (ref 22–31)
CREAT SERPL-MCNC: 0.8 MG/DL — SIGNIFICANT CHANGE UP (ref 0.5–1.3)
EOSINOPHIL # BLD AUTO: 0.19 K/UL — SIGNIFICANT CHANGE UP (ref 0–0.5)
EOSINOPHIL NFR BLD AUTO: 1.8 % — SIGNIFICANT CHANGE UP (ref 0–6)
GLUCOSE SERPL-MCNC: 134 MG/DL — HIGH (ref 70–99)
HCT VFR BLD CALC: 51.2 % — HIGH (ref 39–50)
HGB BLD-MCNC: 17.2 G/DL — HIGH (ref 13–17)
IMM GRANULOCYTES NFR BLD AUTO: 0.6 % — SIGNIFICANT CHANGE UP (ref 0–1.5)
LYMPHOCYTES # BLD AUTO: 2.93 K/UL — SIGNIFICANT CHANGE UP (ref 1–3.3)
LYMPHOCYTES # BLD AUTO: 27.5 % — SIGNIFICANT CHANGE UP (ref 13–44)
MCHC RBC-ENTMCNC: 29.8 PG — SIGNIFICANT CHANGE UP (ref 27–34)
MCHC RBC-ENTMCNC: 33.6 GM/DL — SIGNIFICANT CHANGE UP (ref 32–36)
MCV RBC AUTO: 88.7 FL — SIGNIFICANT CHANGE UP (ref 80–100)
MONOCYTES # BLD AUTO: 0.63 K/UL — SIGNIFICANT CHANGE UP (ref 0–0.9)
MONOCYTES NFR BLD AUTO: 5.9 % — SIGNIFICANT CHANGE UP (ref 2–14)
NEUTROPHILS # BLD AUTO: 6.79 K/UL — SIGNIFICANT CHANGE UP (ref 1.8–7.4)
NEUTROPHILS NFR BLD AUTO: 63.6 % — SIGNIFICANT CHANGE UP (ref 43–77)
NRBC # BLD: 0 /100 WBCS — SIGNIFICANT CHANGE UP (ref 0–0)
PLATELET # BLD AUTO: 200 K/UL — SIGNIFICANT CHANGE UP (ref 150–400)
POTASSIUM SERPL-MCNC: 4.5 MMOL/L — SIGNIFICANT CHANGE UP (ref 3.5–5.3)
POTASSIUM SERPL-SCNC: 4.5 MMOL/L — SIGNIFICANT CHANGE UP (ref 3.5–5.3)
PROT SERPL-MCNC: 7.1 G/DL — SIGNIFICANT CHANGE UP (ref 6–8.3)
RBC # BLD: 5.77 M/UL — SIGNIFICANT CHANGE UP (ref 4.2–5.8)
RBC # FLD: 14 % — SIGNIFICANT CHANGE UP (ref 10.3–14.5)
SARS-COV-2 RNA SPEC QL NAA+PROBE: NEGATIVE — SIGNIFICANT CHANGE UP
SODIUM SERPL-SCNC: 141 MMOL/L — SIGNIFICANT CHANGE UP (ref 135–145)
WBC # BLD: 10.66 K/UL — HIGH (ref 3.8–10.5)
WBC # FLD AUTO: 10.66 K/UL — HIGH (ref 3.8–10.5)

## 2021-08-03 PROCEDURE — 93005 ELECTROCARDIOGRAM TRACING: CPT

## 2021-08-03 PROCEDURE — 99285 EMERGENCY DEPT VISIT HI MDM: CPT

## 2021-08-03 PROCEDURE — 80053 COMPREHEN METABOLIC PANEL: CPT

## 2021-08-03 PROCEDURE — 70496 CT ANGIOGRAPHY HEAD: CPT | Mod: MG

## 2021-08-03 PROCEDURE — 70496 CT ANGIOGRAPHY HEAD: CPT | Mod: 26,MG

## 2021-08-03 PROCEDURE — 70450 CT HEAD/BRAIN W/O DYE: CPT | Mod: MG

## 2021-08-03 PROCEDURE — 36415 COLL VENOUS BLD VENIPUNCTURE: CPT

## 2021-08-03 PROCEDURE — G1004: CPT

## 2021-08-03 PROCEDURE — 87635 SARS-COV-2 COVID-19 AMP PRB: CPT

## 2021-08-03 PROCEDURE — 85025 COMPLETE CBC W/AUTO DIFF WBC: CPT

## 2021-08-03 PROCEDURE — 70498 CT ANGIOGRAPHY NECK: CPT | Mod: 26,MG

## 2021-08-03 PROCEDURE — 93010 ELECTROCARDIOGRAM REPORT: CPT

## 2021-08-03 PROCEDURE — 99284 EMERGENCY DEPT VISIT MOD MDM: CPT | Mod: 25

## 2021-08-03 PROCEDURE — 70498 CT ANGIOGRAPHY NECK: CPT | Mod: MG

## 2021-08-03 NOTE — ED PROVIDER NOTE - OBJECTIVE STATEMENT
55 M co ha, blurry vision- 55 M co ha, blurry vision- HTN, DM, high lipids, afib- not on AC 55 M co ha, blurry vision- HTN, DM, high lipids, afib- not on AC- co int ha- feels like a stone is on his head- blurry vision- both eyes- with walking yesterday  felt extremely unsteady was having difficulty walking- porter with his vision- when he sat down he was able to read a book, vision improved- went to pcp today  pcp felt px was unsteady- sent to ED for eval  co moderate gen throbbing ha to entire head no n/v  no trauma no f/c  no sig exac/allev factors

## 2021-08-03 NOTE — ED PROVIDER NOTE - PATIENT PORTAL LINK FT
You can access the FollowMyHealth Patient Portal offered by [facility] by registering at the following website: [email or web address]. By joining [facility]’s FollowMyHealth portal, you will also be able to view your health information using other applications (apps) compatible with our system.

## 2021-08-03 NOTE — ED PROVIDER NOTE - NEUROLOGICAL, MLM
Alert and oriented, no focal deficits, no motor or sensory deficits. nml gait, neg rhomberg, neg pronator

## 2021-08-03 NOTE — ED ADULT TRIAGE NOTE - NS ED NURSE AMBULANCES
How Severe Is Your Skin Lesion?: mild Have Your Skin Lesions Been Treated?: not been treated Is This A New Presentation, Or A Follow-Up?: Skin Lesions Alvin

## 2021-08-03 NOTE — ED ADULT NURSE NOTE - OBJECTIVE STATEMENT
56yo male c/o unsteady gait since 4pm yesterday. Pt. reports at 4pm yesterday he started having blurry vision, headache and unsteady gait, reports that blurry vision resolved. Denies chest pain, fever/chills, SOB, dizziness, palpitations, n/v/d.

## 2021-08-03 NOTE — ED PROVIDER NOTE - CLINICAL SUMMARY MEDICAL DECISION MAKING FREE TEXT BOX
dizziness/nonspecific visual changes dizziness/nonspecific visual changes- CT head- CTa head/neck negative

## 2021-08-03 NOTE — ED PROVIDER NOTE - NSFOLLOWUPINSTRUCTIONS_ED_ALL_ED_FT
Tension Headache, Adult    A tension headache is a feeling of pain, pressure, or aching in the head that is often felt over the front and sides of the head. The pain can be dull, or it can feel tight (constricting). There are two types of tension headache:  •Episodic tension headache. This is when the headaches happen fewer than 15 days a month.      •Chronic tension headache. This is when the headaches happen more than 15 days a month during a 3-month period.      A tension headache can last from 30 minutes to several days. It is the most common kind of headache. Tension headaches are not normally associated with nausea or vomiting, and they do not get worse with physical activity.      What are the causes?  The exact cause of this condition is not known. Tension headaches are often triggered by stress, anxiety, or depression. Other triggers include:  •Alcohol.      •Too much caffeine or caffeine withdrawal.      •Respiratory infections, such as colds, flu, or sinus infections.      •Dental problems or teeth clenching.      •Tiredness (fatigue).      •Holding your head and neck in the same position for a long period of time, such as while using a computer.      •Smoking.      •Arthritis of the neck.        What are the signs or symptoms?  Symptoms of this condition include:  •A feeling of pressure or tightness around the head.      •Dull, aching head pain.      •Pain over the front and sides of the head.      •Tenderness in the muscles of the head, neck, and shoulders.        How is this diagnosed?    This condition may be diagnosed based on your symptoms, your medical history, and a physical exam. If your symptoms are severe or unusual, you may have imaging tests, such as a CT scan or an MRI of your head. Your vision may also be checked.      How is this treated?    This condition may be treated with lifestyle changes and with medicines that help relieve symptoms.      Follow these instructions at home:    Managing pain     •Take over-the-counter and prescription medicines only as told by your health care provider.      •When you have a headache, lie down in a dark, quiet room.    •If directed, apply ice to the head and neck:  •Put ice in a plastic bag.      •Place a towel between your skin and the bag.      •Leave the ice on for 20 minutes, 2–3 times a day.      •If directed, apply heat to the back of your neck as often as told by your health care provider. Use the heat source that your health care provider recommends, such as a moist heat pack or a heating pad.  •Place a towel between your skin and the heat source.      •Leave the heat on for 20–30 minutes.      •Remove the heat if your skin turns bright red. This is especially important if you are unable to feel pain, heat, or cold. You may have a greater risk of getting burned.        Eating and drinking     •Eat meals on a regular schedule.      •Limit alcohol intake to no more than 1 drink a day for nonpregnant women and 2 drinks a day for men. One drink equals 12 oz of beer, 5 oz of wine, or 1½ oz of hard liquor.      •Drink enough fluid to keep your urine pale yellow.      •Decrease your caffeine intake, or stop using caffeine.      Lifestyle     •Get 7–9 hours of sleep each night, or get the amount of sleep recommended by your health care provider.      •At bedtime, remove all electronic devices from your room. Electronic devices include computers, phones, and tablets.    •Find ways to manage your stress. Some things that can help relieve stress include:  •Exercise.      •Deep breathing exercises.      •Yoga.      •Listening to music.      •Positive mental imagery.        •Try to sit up straight and avoid tensing your muscles.      • Do not use any products that contain nicotine or tobacco, such as cigarettes and e-cigarettes. If you need help quitting, ask your health care provider.        General instructions      •Keep all follow-up visits as told by your health care provider. This is important.    •Avoid any headache triggers. Keep a headache journal to help find out what may trigger your headaches. For example, write down:  •What you eat and drink.      •How much sleep you get.      •Any change to your diet or medicines.          Contact a health care provider if:    •Your headache does not get better.      •Your headache comes back.      •You are sensitive to sounds, light, or smells because of a headache.      •You have nausea or you vomit.      •Your stomach hurts.        Get help right away if:  •You suddenly develop a very severe headache along with any of the following:  •A stiff neck.      •Nausea and vomiting.      •Confusion.      •Weakness.      •Double vision or loss of vision.      •Shortness of breath.      •Rash.      •Unusual sleepiness.      •Fever.      •Trouble speaking.      •Pain in your eyes or ears.      •Trouble walking or balancing.      •Feeling faint or passing out.          Summary    •A tension headache is a feeling of pain, pressure, or aching in the head that is often felt over the front and sides of the head.      •A tension headache can last from 30 minutes to several days. It is the most common kind of headache.      •This condition may be diagnosed based on your symptoms, your medical history, and a physical exam.      •This condition may be treated with lifestyle changes and with medicines that help relieve symptoms.      This information is not intended to replace advice given to you by your health care provider. Make sure you discuss any questions you have with your health care provider.      Document Revised: 10/14/2020 Document Reviewed: 03/30/2018    Elsevier Patient Education © 2021 [employer]

## 2021-08-24 ENCOUNTER — RX RENEWAL (OUTPATIENT)
Age: 56
End: 2021-08-24

## 2021-09-23 ENCOUNTER — NON-APPOINTMENT (OUTPATIENT)
Age: 56
End: 2021-09-23

## 2021-09-30 ENCOUNTER — RX RENEWAL (OUTPATIENT)
Age: 56
End: 2021-09-30

## 2021-10-18 ENCOUNTER — RX RENEWAL (OUTPATIENT)
Age: 56
End: 2021-10-18

## 2021-10-21 ENCOUNTER — APPOINTMENT (OUTPATIENT)
Dept: HEART AND VASCULAR | Facility: CLINIC | Age: 56
End: 2021-10-21
Payer: MEDICAID

## 2021-10-21 VITALS
DIASTOLIC BLOOD PRESSURE: 74 MMHG | SYSTOLIC BLOOD PRESSURE: 120 MMHG | WEIGHT: 240 LBS | HEIGHT: 68 IN | BODY MASS INDEX: 36.37 KG/M2 | HEART RATE: 62 BPM | TEMPERATURE: 97.6 F

## 2021-10-21 PROCEDURE — 93000 ELECTROCARDIOGRAM COMPLETE: CPT

## 2021-10-21 PROCEDURE — 99214 OFFICE O/P EST MOD 30 MIN: CPT | Mod: 25

## 2021-10-21 NOTE — HISTORY OF PRESENT ILLNESS
[FreeTextEntry1] : no CP/sob- has UE weakness/numbness and DX with neuropathy. also with RLE claudication -progressive and has to stop and rest and then able to continue\par still smoking

## 2021-10-21 NOTE — PHYSICAL EXAM
[Well Developed] : well developed [Well Nourished] : well nourished [No Acute Distress] : no acute distress [Normal Conjunctiva] : normal conjunctiva [Normal Venous Pressure] : normal venous pressure [No Carotid Bruit] : no carotid bruit [Normal S1, S2] : normal S1, S2 [No Murmur] : no murmur [Clear Lung Fields] : clear lung fields [Good Air Entry] : good air entry [No Respiratory Distress] : no respiratory distress  [Soft] : abdomen soft [Non Tender] : non-tender [Normal Bowel Sounds] : normal bowel sounds [Normal Gait] : normal gait [No Edema] : no edema [No Cyanosis] : no cyanosis [Venous varicosities] : venous varicosities [No Rash] : no rash [Moves all extremities] : moves all extremities [Normal Speech] : normal speech [Alert and Oriented] : alert and oriented [de-identified] : large ventral hernia

## 2021-10-21 NOTE — DISCUSSION/SUMMARY
[FreeTextEntry1] : EKG:NSR,LVH\par reviewed labs he brought in done last week; WL=232an%,IB=127rc%,LDL=32.8- would reduce Lipitor and continue Lovaza for lipids( advised to get repeat labs in 2 months)- continue Zestril for HPTN, asa for mild CAD\par I feel he has claudication- needs to stop smoking and see vascular

## 2021-11-15 ENCOUNTER — RX RENEWAL (OUTPATIENT)
Age: 56
End: 2021-11-15

## 2021-12-07 ENCOUNTER — RX RENEWAL (OUTPATIENT)
Age: 56
End: 2021-12-07

## 2022-01-24 ENCOUNTER — APPOINTMENT (OUTPATIENT)
Dept: HEART AND VASCULAR | Facility: CLINIC | Age: 57
End: 2022-01-24

## 2022-02-15 ENCOUNTER — RX RENEWAL (OUTPATIENT)
Age: 57
End: 2022-02-15

## 2022-02-28 ENCOUNTER — APPOINTMENT (OUTPATIENT)
Dept: HEART AND VASCULAR | Facility: CLINIC | Age: 57
End: 2022-02-28
Payer: MEDICAID

## 2022-02-28 ENCOUNTER — NON-APPOINTMENT (OUTPATIENT)
Age: 57
End: 2022-02-28

## 2022-02-28 VITALS
SYSTOLIC BLOOD PRESSURE: 118 MMHG | DIASTOLIC BLOOD PRESSURE: 75 MMHG | HEART RATE: 60 BPM | BODY MASS INDEX: 36.37 KG/M2 | HEIGHT: 68 IN | WEIGHT: 240 LBS | TEMPERATURE: 97 F

## 2022-02-28 PROCEDURE — 93000 ELECTROCARDIOGRAM COMPLETE: CPT

## 2022-02-28 PROCEDURE — 99214 OFFICE O/P EST MOD 30 MIN: CPT | Mod: 25

## 2022-02-28 RX ORDER — POLYETHYLENE GLYCOL-3350 AND ELECTROLYTES WITH FLAVOR PACK 240; 5.84; 2.98; 6.72; 22.72 G/278.26G; G/278.26G; G/278.26G; G/278.26G; G/278.26G
240 POWDER, FOR SOLUTION ORAL
Qty: 1 | Refills: 0 | Status: DISCONTINUED | COMMUNITY
Start: 2021-01-19 | End: 2022-02-28

## 2022-02-28 NOTE — PHYSICAL EXAM
[Well Developed] : well developed [Well Nourished] : well nourished [No Acute Distress] : no acute distress [Normal Conjunctiva] : normal conjunctiva [Normal Venous Pressure] : normal venous pressure [No Carotid Bruit] : no carotid bruit [Normal S1, S2] : normal S1, S2 [No Murmur] : no murmur [Clear Lung Fields] : clear lung fields [Good Air Entry] : good air entry [No Respiratory Distress] : no respiratory distress  [Soft] : abdomen soft [Non Tender] : non-tender [Normal Bowel Sounds] : normal bowel sounds [Normal Gait] : normal gait [No Edema] : no edema [No Cyanosis] : no cyanosis [Venous varicosities] : venous varicosities [No Rash] : no rash [Moves all extremities] : moves all extremities [Normal Speech] : normal speech [Alert and Oriented] : alert and oriented [de-identified] : large umbilical/ventral hernia

## 2022-02-28 NOTE — DISCUSSION/SUMMARY
[FreeTextEntry1] : EKG:NSR,LVH\par needs to lose weight and stop smoking- called endocrine office for labs(on hold > 10min- pt has labaand sargent end to me\par continue asa +Toprol  for (distal) CAD;lisinopril for hPTN; Lipitor + lovazza for lipids\par states sees hematology regularly and gets phlebotomy \par pt wants CCTA to f/u CAD\par continue exercise/stop smoking for PVD/claudication

## 2022-02-28 NOTE — HISTORY OF PRESENT ILLNESS
[FreeTextEntry1] : still smoking- states his DM is not controlled and that he had labs with endo and A1c was >8 and lipids were good\par he denies cp/sob- had COVIDS about 2 months ago. has chronic rle pain after walking 5-15minutes/relieved with rest

## 2022-03-14 ENCOUNTER — RX RENEWAL (OUTPATIENT)
Age: 57
End: 2022-03-14

## 2022-04-05 ENCOUNTER — RX RENEWAL (OUTPATIENT)
Age: 57
End: 2022-04-05

## 2022-06-21 ENCOUNTER — RX RENEWAL (OUTPATIENT)
Age: 57
End: 2022-06-21

## 2022-06-28 ENCOUNTER — APPOINTMENT (OUTPATIENT)
Dept: HEART AND VASCULAR | Facility: CLINIC | Age: 57
End: 2022-06-28

## 2022-07-14 ENCOUNTER — APPOINTMENT (OUTPATIENT)
Dept: HEART AND VASCULAR | Facility: CLINIC | Age: 57
End: 2022-07-14

## 2022-07-14 VITALS
BODY MASS INDEX: 37.28 KG/M2 | HEIGHT: 68 IN | OXYGEN SATURATION: 94 % | SYSTOLIC BLOOD PRESSURE: 121 MMHG | HEART RATE: 62 BPM | WEIGHT: 246 LBS | TEMPERATURE: 97.8 F | DIASTOLIC BLOOD PRESSURE: 79 MMHG

## 2022-07-14 DIAGNOSIS — I51.7 CARDIOMEGALY: ICD-10-CM

## 2022-07-14 DIAGNOSIS — I07.1 RHEUMATIC TRICUSPID INSUFFICIENCY: ICD-10-CM

## 2022-07-14 PROCEDURE — 93306 TTE W/DOPPLER COMPLETE: CPT

## 2022-07-14 PROCEDURE — 99214 OFFICE O/P EST MOD 30 MIN: CPT | Mod: 25

## 2022-07-14 PROCEDURE — 93000 ELECTROCARDIOGRAM COMPLETE: CPT

## 2022-07-14 RX ORDER — OMEPRAZOLE 40 MG/1
40 CAPSULE, DELAYED RELEASE ORAL
Qty: 30 | Refills: 0 | Status: DISCONTINUED | COMMUNITY
Start: 2018-02-28 | End: 2022-07-14

## 2022-07-14 NOTE — DISCUSSION/SUMMARY
[FreeTextEntry1] : EKG:NSR\par reviewed lab report he brought in\par LDL=47.2mg%\par LX=484ve%,CU=404wz%\par needs to lose weight/better diet and continue Crestor + Lovaza for lipids; asa +Toprol for CAD; lisinopril for HPTN; has PVD/claudication- advised exercise- offered Pletal- he doesn't want to take more meds- advised vascular evaluation\par needs to stop smoking!!!\par echO: normal LVEF,mild [place],LVH,MR/TR

## 2022-07-14 NOTE — HISTORY OF PRESENT ILLNESS
[FreeTextEntry1] : no cp,sob- has claudication in both legs can walk 2-5 min before he has to stop\par still smoking

## 2022-07-14 NOTE — PHYSICAL EXAM
[Well Developed] : well developed [Well Nourished] : well nourished [No Acute Distress] : no acute distress [Normal Conjunctiva] : normal conjunctiva [Normal Venous Pressure] : normal venous pressure [No Carotid Bruit] : no carotid bruit [Normal S1, S2] : normal S1, S2 [No Murmur] : no murmur [Clear Lung Fields] : clear lung fields [Good Air Entry] : good air entry [No Respiratory Distress] : no respiratory distress  [Soft] : abdomen soft [Non Tender] : non-tender [Normal Bowel Sounds] : normal bowel sounds [Normal Gait] : normal gait [No Edema] : no edema [No Cyanosis] : no cyanosis [Venous varicosities] : venous varicosities [No Rash] : no rash [Moves all extremities] : moves all extremities [Normal Speech] : normal speech [Alert and Oriented] : alert and oriented [de-identified] : large umbilical/ventral hernia

## 2022-07-20 ENCOUNTER — RX RENEWAL (OUTPATIENT)
Age: 57
End: 2022-07-20

## 2022-08-02 ENCOUNTER — RX RENEWAL (OUTPATIENT)
Age: 57
End: 2022-08-02

## 2022-08-03 ENCOUNTER — APPOINTMENT (OUTPATIENT)
Dept: VASCULAR SURGERY | Facility: CLINIC | Age: 57
End: 2022-08-03

## 2022-08-03 VITALS
SYSTOLIC BLOOD PRESSURE: 133 MMHG | DIASTOLIC BLOOD PRESSURE: 79 MMHG | WEIGHT: 245 LBS | HEART RATE: 68 BPM | HEIGHT: 68 IN | BODY MASS INDEX: 37.13 KG/M2

## 2022-08-03 DIAGNOSIS — Z87.891 PERSONAL HISTORY OF NICOTINE DEPENDENCE: ICD-10-CM

## 2022-08-03 DIAGNOSIS — M79.2 NEURALGIA AND NEURITIS, UNSPECIFIED: ICD-10-CM

## 2022-08-03 PROCEDURE — 99203 OFFICE O/P NEW LOW 30 MIN: CPT

## 2022-08-03 PROCEDURE — 93923 UPR/LXTR ART STDY 3+ LVLS: CPT

## 2022-08-08 NOTE — PROCEDURE
[FreeTextEntry1] : ARISTEO/PVRs ordered to r/o art insuff, show: R ARISTEO 1.1 and L ARISTEO 1.2, pulsatile waveforms noted through out the LEs.

## 2022-08-08 NOTE — ASSESSMENT
[FreeTextEntry1] : 57 y/o M w/ radiculopathy pain possibly from spinal stenosis, pinch nerve or herniated disc. On exam, palpable PT and DP pulses bilaterally. Bulging vein noted on both shins. Feet warm to touch, good capillary refill. ARISTEO/PVRs normal. Reassured pt symptoms are not vascular in origin. We recommend him to discuss with Dr Tamayo obtaining a referral for orthopedic evaluation. He may f/u here prn.

## 2022-08-08 NOTE — PHYSICAL EXAM
[Normal Rate and Rhythm] : normal rate and rhythm [2+] : left 2+ [Varicose Veins Of Lower Extremities] : bilaterally [Ankle Swelling On The Right] : mild [Ankle Swelling (On Exam)] : not present [] : not present [de-identified] : WN/WD [FreeTextEntry1] : LEs warm to touch. Normal capillary refill in all toes. Bulging vein on bilateral shins, soft, nontender.  [de-identified] : FROM

## 2022-08-08 NOTE — HISTORY OF PRESENT ILLNESS
[FreeTextEntry1] : 57 y/o M referred by Dr Tamayo. He has a PMHx of CAD, HLD, HTN, active smoker, T2DM, BROWN, glaucoma, and MVP. Patient presents today with concerns of "stiff legs and pain when walking, mainly on the right leg". He explains the pain is mainly localized to the calf. He explains he can maybe walk about 20-25 feet, then the right leg becomes stiff. He need to sit down, let the pain go away before he can continue walking. He explains this past Saturday, he was standing and measuring a wall when he suddenly felt searing pain on the R calf traveling down. The symptoms have been ongoing for the past few years but usually would disappear and now they have persisted and have worsened. Denies any back/hip pain, upper leg pain, leg swelling, trauma, skin breakdown, rest pain or leg coolness. He is on aspirin and statin.\par \par \par SHx:\par Active smoker\par \par PSHx:\par Gastric bypass 2000, gastric sleeve 2016, and bowel obstruction 2014

## 2022-08-10 ENCOUNTER — NON-APPOINTMENT (OUTPATIENT)
Age: 57
End: 2022-08-10

## 2022-08-28 ENCOUNTER — RX RENEWAL (OUTPATIENT)
Age: 57
End: 2022-08-28

## 2022-09-15 ENCOUNTER — APPOINTMENT (OUTPATIENT)
Dept: THORACIC SURGERY | Facility: CLINIC | Age: 57
End: 2022-09-15

## 2022-10-27 ENCOUNTER — NON-APPOINTMENT (OUTPATIENT)
Age: 57
End: 2022-10-27

## 2022-10-27 ENCOUNTER — APPOINTMENT (OUTPATIENT)
Dept: HEART AND VASCULAR | Facility: CLINIC | Age: 57
End: 2022-10-27

## 2022-10-27 VITALS
BODY MASS INDEX: 37.13 KG/M2 | HEART RATE: 63 BPM | DIASTOLIC BLOOD PRESSURE: 80 MMHG | HEIGHT: 68 IN | TEMPERATURE: 97.4 F | SYSTOLIC BLOOD PRESSURE: 134 MMHG | OXYGEN SATURATION: 94 % | WEIGHT: 245 LBS

## 2022-10-27 PROCEDURE — 93000 ELECTROCARDIOGRAM COMPLETE: CPT

## 2022-10-27 PROCEDURE — 99214 OFFICE O/P EST MOD 30 MIN: CPT | Mod: 25

## 2022-10-27 RX ORDER — ASPIRIN 81 MG/1
81 TABLET ORAL DAILY
Qty: 30 | Refills: 6 | Status: DISCONTINUED | COMMUNITY
End: 2022-10-27

## 2022-10-27 RX ORDER — NETARSUDIL AND LATANOPROST OPHTHALMIC SOLUTION, 0.02%/0.005% .2; .05 MG/ML; MG/ML
0.02-0.005 SOLUTION/ DROPS OPHTHALMIC; TOPICAL
Qty: 2 | Refills: 0 | Status: ACTIVE | COMMUNITY
Start: 2022-09-28

## 2022-10-27 RX ORDER — CYCLOBENZAPRINE HYDROCHLORIDE 10 MG/1
10 TABLET, FILM COATED ORAL
Qty: 20 | Refills: 0 | Status: DISCONTINUED | COMMUNITY
Start: 2022-09-22

## 2022-10-27 RX ORDER — GABAPENTIN 100 MG/1
100 CAPSULE ORAL
Qty: 90 | Refills: 0 | Status: DISCONTINUED | COMMUNITY
Start: 2022-10-03

## 2022-10-27 RX ORDER — KETOROLAC TROMETHAMINE 10 MG/1
10 TABLET, FILM COATED ORAL
Qty: 10 | Refills: 0 | Status: DISCONTINUED | COMMUNITY
Start: 2022-09-22

## 2022-10-27 RX ORDER — BRINZOLAMIDE/BRIMONIDINE TARTRATE 10; 2 MG/ML; MG/ML
1-0.2 SUSPENSION/ DROPS OPHTHALMIC
Qty: 8 | Refills: 0 | Status: ACTIVE | COMMUNITY
Start: 2022-10-23

## 2022-10-27 NOTE — DISCUSSION/SUMMARY
[FreeTextEntry1] : EKG:NSR\par advised to see orthopedist\par needs to stop smoking\par continue asa + Toprol for CAD; lisinopril for HPTN;lipitor for HLD\par meds refilled

## 2022-10-27 NOTE — PHYSICAL EXAM
[Well Developed] : well developed [Well Nourished] : well nourished [No Acute Distress] : no acute distress [Normal Conjunctiva] : normal conjunctiva [Normal Venous Pressure] : normal venous pressure [No Carotid Bruit] : no carotid bruit [Normal S1, S2] : normal S1, S2 [No Murmur] : no murmur [Clear Lung Fields] : clear lung fields [Good Air Entry] : good air entry [No Respiratory Distress] : no respiratory distress  [Soft] : abdomen soft [Non Tender] : non-tender [Normal Bowel Sounds] : normal bowel sounds [Normal Gait] : normal gait [No Edema] : no edema [No Cyanosis] : no cyanosis [Venous varicosities] : venous varicosities [No Rash] : no rash [Moves all extremities] : moves all extremities [Normal Speech] : normal speech [Alert and Oriented] : alert and oriented [de-identified] : large umbilical/ventral hernia

## 2022-11-07 ENCOUNTER — RX RENEWAL (OUTPATIENT)
Age: 57
End: 2022-11-07

## 2022-12-05 ENCOUNTER — RX RENEWAL (OUTPATIENT)
Age: 57
End: 2022-12-05

## 2022-12-05 RX ORDER — ASPIRIN ENTERIC COATED TABLETS 81 MG 81 MG/1
81 TABLET, DELAYED RELEASE ORAL
Qty: 30 | Refills: 6 | Status: ACTIVE | COMMUNITY
Start: 2021-12-07 | End: 1900-01-01

## 2022-12-28 ENCOUNTER — RX RENEWAL (OUTPATIENT)
Age: 57
End: 2022-12-28

## 2023-02-01 ENCOUNTER — NON-APPOINTMENT (OUTPATIENT)
Age: 58
End: 2023-02-01

## 2023-02-06 ENCOUNTER — RX RENEWAL (OUTPATIENT)
Age: 58
End: 2023-02-06

## 2023-03-09 ENCOUNTER — APPOINTMENT (OUTPATIENT)
Dept: HEART AND VASCULAR | Facility: CLINIC | Age: 58
End: 2023-03-09

## 2023-03-19 ENCOUNTER — RX RENEWAL (OUTPATIENT)
Age: 58
End: 2023-03-19

## 2023-04-04 ENCOUNTER — RX RENEWAL (OUTPATIENT)
Age: 58
End: 2023-04-04

## 2023-04-06 ENCOUNTER — APPOINTMENT (OUTPATIENT)
Dept: HEART AND VASCULAR | Facility: CLINIC | Age: 58
End: 2023-04-06

## 2023-04-17 ENCOUNTER — RX RENEWAL (OUTPATIENT)
Age: 58
End: 2023-04-17

## 2023-05-15 ENCOUNTER — RX RENEWAL (OUTPATIENT)
Age: 58
End: 2023-05-15

## 2023-05-16 ENCOUNTER — APPOINTMENT (OUTPATIENT)
Dept: HEART AND VASCULAR | Facility: CLINIC | Age: 58
End: 2023-05-16
Payer: MEDICAID

## 2023-05-16 ENCOUNTER — NON-APPOINTMENT (OUTPATIENT)
Age: 58
End: 2023-05-16

## 2023-05-16 VITALS
SYSTOLIC BLOOD PRESSURE: 122 MMHG | BODY MASS INDEX: 37.89 KG/M2 | DIASTOLIC BLOOD PRESSURE: 77 MMHG | TEMPERATURE: 97.6 F | HEIGHT: 68 IN | WEIGHT: 250 LBS | OXYGEN SATURATION: 94 % | HEART RATE: 68 BPM

## 2023-05-16 DIAGNOSIS — E78.5 HYPERLIPIDEMIA, UNSPECIFIED: ICD-10-CM

## 2023-05-16 PROCEDURE — 99214 OFFICE O/P EST MOD 30 MIN: CPT | Mod: 25

## 2023-05-16 PROCEDURE — 93000 ELECTROCARDIOGRAM COMPLETE: CPT

## 2023-05-16 NOTE — DISCUSSION/SUMMARY
[FreeTextEntry1] : EKG:NSR\par needs to lose weight\par he showed me labs done in April\par LDL=52mg%\par he will be getting surgery for hernia\par continue asa + Toprol for CAD;lipitor + Lovaza for HLD ;Lisinopril for HPTN

## 2023-05-16 NOTE — PHYSICAL EXAM
[Well Developed] : well developed [Well Nourished] : well nourished [No Acute Distress] : no acute distress [Normal Conjunctiva] : normal conjunctiva [Normal Venous Pressure] : normal venous pressure [No Carotid Bruit] : no carotid bruit [Normal S1, S2] : normal S1, S2 [No Murmur] : no murmur [Clear Lung Fields] : clear lung fields [Good Air Entry] : good air entry [No Respiratory Distress] : no respiratory distress  [Soft] : abdomen soft [Non Tender] : non-tender [Normal Bowel Sounds] : normal bowel sounds [Normal Gait] : normal gait [No Edema] : no edema [No Cyanosis] : no cyanosis [Venous varicosities] : venous varicosities [No Rash] : no rash [Moves all extremities] : moves all extremities [Normal Speech] : normal speech [Alert and Oriented] : alert and oriented [de-identified] : large umbilical/ventral hernia

## 2023-06-09 ENCOUNTER — RX RENEWAL (OUTPATIENT)
Age: 58
End: 2023-06-09

## 2023-06-09 ENCOUNTER — APPOINTMENT (OUTPATIENT)
Dept: SURGERY | Facility: CLINIC | Age: 58
End: 2023-06-09
Payer: MEDICAID

## 2023-06-09 VITALS
WEIGHT: 254.13 LBS | HEART RATE: 66 BPM | SYSTOLIC BLOOD PRESSURE: 131 MMHG | BODY MASS INDEX: 38.51 KG/M2 | OXYGEN SATURATION: 98 % | TEMPERATURE: 97.3 F | DIASTOLIC BLOOD PRESSURE: 79 MMHG | HEIGHT: 68 IN

## 2023-06-09 DIAGNOSIS — Z98.84 BARIATRIC SURGERY STATUS: ICD-10-CM

## 2023-06-09 DIAGNOSIS — Z78.9 OTHER SPECIFIED HEALTH STATUS: ICD-10-CM

## 2023-06-09 PROCEDURE — 99214 OFFICE O/P EST MOD 30 MIN: CPT

## 2023-06-09 NOTE — PHYSICAL EXAM
[Oriented to Person] : oriented to person [Oriented to Place] : oriented to place [Oriented to Time] : oriented to time [Calm] : calm [Abdominal Masses] : No abdominal masses [Abdomen Tenderness] : ~T ~M No abdominal tenderness [Tender] : was nontender [Enlarged] : not enlarged [de-identified] : NAD, comfortable [de-identified] : Normocephalic, atraumatic. No scleral icterus.  [de-identified] : Supple, no JVD or cervical lymphadenopathy.  [de-identified] : No respiratory distress  [de-identified] : +BS soft, nontender, nondistended. Well healed prior incisions. There is a  large incisional hernia, reducible, nontender. No overt abdominal mass.

## 2023-06-09 NOTE — ASSESSMENT
[FreeTextEntry1] : Case discussed/pt seen with attending, . 56 y/o male with hx of multiple bariatric surgery now with large incisional hernia. Has not had f/u with bariatric. Discussed currently he is not a good candidate for an elective hernia repair d/t his surgical hx, uncontrolled diabetes, current bmi (38), and him actively smoking. We discussed in detail that according to the [facility]AR betsey he is currently at an 83% risk for complication. Discussed recommendation of weight loss, glycemic control and smoking cessation prior to elective hernia repair. He expressed understanding. Discussed referral to bariatric for f/u and consideration of surgery. All questions answered. Notably greater than 50% of today's visit was spent on counseling and coordination of patients care. \par \par \par Plan:\par -Weight loss (Current BMI 38, referral to bariatrics)\par -Glycemic control - Hx diabetes (last HGA1C 8.1 from labs 4/3/23)\par -Smoking cessation- Active smoker ( 1+pack/day)\par

## 2023-06-09 NOTE — HISTORY OF PRESENT ILLNESS
[de-identified] : This is a 58 y/o male with pmhx of CAD, DM, HLD, HTN, MVP, active smoker, presenting to the office for evaluation and management of an ventral/incisional hernia. He reports he first noted a bulge at his midline abdominal incision site x 6 years ago. He reports the bulge has increased in size since first noticed. He denies any significant pain, however does have occasional discomfort especially after eating a large meal or if he has constipation. He reports he is an active smoker, 1 pack/day x 30 years. He sees pulmonary with yearly CT scans. He reports a hx of constipation, takes stool softener prn, colonoscopy x 1 month ago which was benign per pt. He also notes a hx of sleep apnea, has bipap however does not use it. Pt has a complicated surgical hx - initially had a lap band (2012) which was removed emergently d/t SBO (2014), then pt underwent a sleeve gastrectomy at [facility] (2015).

## 2023-06-09 NOTE — DATA REVIEWED
[FreeTextEntry1] : PROCEDURE DATE:  12/01/2020\par \par \par INTERPRETATION:  CT SCAN OF ABDOMEN AND PELVIS\par \par History: Preoperative assessment, incisional hernia\par \par Technique: CT scan of abdomen and pelvis was performed from lung bases through symphysis pubis. Intravenous and oral contrast material were utilized. Axial, sagittal and coronal reformatted images were reviewed.\par \par Comparison: CT abdomen and pelvis from 8/7/2018.\par \par Findings:\par \par Lower chest: Unremarkable.\par \par Liver:  Normal size and smooth contour. No focal lesion.\par \par Gallbladder/biliary system: Unremarkable gallbladder. No radiopaque stones. No biliary dilatation.\par \par Spleen:  Unremarkable.\par \par Pancreas:  Unremarkable.\par \par Adrenal glands:  Unremarkable.\par \par Kidneys: No hydronephrosis. Few small left renal cortical cysts.\par \par Adenopathy:  No lymphadenopathy in abdomen or pelvis.\par \par Ascites: None.\par \par Gastrointestinal tract: Small hiatal hernia. Operative changes lap band surgery. No obstruction or bowel wall thickening or perienteric stranding. Normal appendix. No pneumoperitoneum.\par \par Vessels: Mild calcified plaque aorta. Normal caliber aorta.\par \par Pelvic organs: Unremarkable bladder. Unchanged mild prostatic enlargement.\par \par Soft tissues: There are 4 midline wide neck ventral hernias containing small bowel loops dominant hernias as follows:\par *  Upper abdomen, sac measures 10.7 x 8.2 x 10.7 cm with neck measuring 6.5 cm craniocaudal plane and 4.5 cm transverse plane.\par *  Supraumbilical, sac measures 9.0 x 3.6 x 8.2 cm, with neck measuring 2.7 cm in craniocaudal plane 6.4 cm in transverse plane.\par Unchanged small fat-containing left inguinal hernia.\par Bilateral symmetric gynecomastia.\par \par Bones: Unchanged mild degenerative changes of the spine. Unchanged minimal L4 on L5 anterolisthesis.\par \par Impression:\par 1.  Since August 7, 2018, no significant change few midline wide neck ventral hernia sacs containing nonobstructed bowel loops. No obstruction or signs of strangulation.\par 2.  Unchanged small fat-containing left inguinal hernia.\par 3.  Unchanged mild prostatic enlargement.

## 2023-06-11 ENCOUNTER — RX RENEWAL (OUTPATIENT)
Age: 58
End: 2023-06-11

## 2023-06-14 ENCOUNTER — APPOINTMENT (OUTPATIENT)
Dept: BARIATRICS | Facility: CLINIC | Age: 58
End: 2023-06-14
Payer: MEDICAID

## 2023-06-14 VITALS
DIASTOLIC BLOOD PRESSURE: 80 MMHG | WEIGHT: 251.38 LBS | HEIGHT: 68 IN | BODY MASS INDEX: 38.1 KG/M2 | OXYGEN SATURATION: 97 % | HEART RATE: 67 BPM | TEMPERATURE: 97.3 F | SYSTOLIC BLOOD PRESSURE: 127 MMHG

## 2023-06-14 DIAGNOSIS — E11.9 TYPE 2 DIABETES MELLITUS W/OUT COMPLICATIONS: ICD-10-CM

## 2023-06-14 DIAGNOSIS — F17.210 NICOTINE DEPENDENCE, CIGARETTES, UNCOMPLICATED: ICD-10-CM

## 2023-06-14 DIAGNOSIS — G47.33 OBSTRUCTIVE SLEEP APNEA (ADULT) (PEDIATRIC): ICD-10-CM

## 2023-06-14 DIAGNOSIS — K43.2 INCISIONAL HERNIA W/OUT OBSTRUCTION OR GANGRENE: ICD-10-CM

## 2023-06-14 PROCEDURE — 99215 OFFICE O/P EST HI 40 MIN: CPT

## 2023-06-14 NOTE — END OF VISIT
[FreeTextEntry3] : All medical record entries made by the Scribe were at my, EVELYN Graham , direction and personally dictated by me on 06/14/2023 . I have reviewed the chart and agree that the record accurately reflects my personal performance of the history, physical exam, assessment and plan. I have also personally directed, reviewed, and agreed with the chart.\par

## 2023-06-14 NOTE — ADDENDUM
[FreeTextEntry1] : Documented by Brandie Pratida acting as a scribe for EVELYN Graham on 06/14/2023\par

## 2023-06-14 NOTE — PLAN
[FreeTextEntry1] : Have ordered UGI series. Patient to send EGD report to the office. Has cardiology clearance. Will have him return to the office for further discussion upon the completion of the UGI series and review of the EGD report.

## 2023-06-14 NOTE — HISTORY OF PRESENT ILLNESS
[de-identified] :  returns to see us today. He is a 58 y/o male with pmhx of CAD, DM, HLD, HTN, MVP, active smoker and with a current ventral/incisional hernia as well as s/p VSG at [facility] (2015). Interested in conversion surgery. From a bariatric standpoint, he had multiple revisions of lap band surgery and had an emergent small bowel obstruction where the band was removed. Following this he was converted to sleeve gastrectomy at [facility]. Have discussed the need for lifestyle modification with a healthy diet and an increased physical activity. Explained the RYGB vs MDS with all the risks and benefits. States he has had an EGD.  At this time, have ordered UGI series. Patient to send EGD report to the office. Has cardiology clearance. Will have him return to the office for further discussion upon the completion of the UGI series and review of the EGD report.

## 2023-06-19 ENCOUNTER — EMERGENCY (EMERGENCY)
Facility: HOSPITAL | Age: 58
LOS: 1 days | Discharge: HOME | End: 2023-06-19
Attending: STUDENT IN AN ORGANIZED HEALTH CARE EDUCATION/TRAINING PROGRAM | Admitting: STUDENT IN AN ORGANIZED HEALTH CARE EDUCATION/TRAINING PROGRAM
Payer: COMMERCIAL

## 2023-06-19 VITALS
OXYGEN SATURATION: 98 % | SYSTOLIC BLOOD PRESSURE: 128 MMHG | HEART RATE: 61 BPM | DIASTOLIC BLOOD PRESSURE: 70 MMHG | RESPIRATION RATE: 16 BRPM | TEMPERATURE: 98 F

## 2023-06-19 VITALS
DIASTOLIC BLOOD PRESSURE: 74 MMHG | WEIGHT: 250 LBS | OXYGEN SATURATION: 98 % | HEART RATE: 66 BPM | RESPIRATION RATE: 18 BRPM | TEMPERATURE: 98 F | SYSTOLIC BLOOD PRESSURE: 143 MMHG

## 2023-06-19 DIAGNOSIS — Z79.4 LONG TERM (CURRENT) USE OF INSULIN: ICD-10-CM

## 2023-06-19 DIAGNOSIS — E11.9 TYPE 2 DIABETES MELLITUS WITHOUT COMPLICATIONS: ICD-10-CM

## 2023-06-19 DIAGNOSIS — Z98.84 BARIATRIC SURGERY STATUS: ICD-10-CM

## 2023-06-19 DIAGNOSIS — I10 ESSENTIAL (PRIMARY) HYPERTENSION: ICD-10-CM

## 2023-06-19 DIAGNOSIS — Z79.84 LONG TERM (CURRENT) USE OF ORAL HYPOGLYCEMIC DRUGS: ICD-10-CM

## 2023-06-19 DIAGNOSIS — R10.10 UPPER ABDOMINAL PAIN, UNSPECIFIED: ICD-10-CM

## 2023-06-19 DIAGNOSIS — K92.0 HEMATEMESIS: ICD-10-CM

## 2023-06-19 DIAGNOSIS — E78.5 HYPERLIPIDEMIA, UNSPECIFIED: ICD-10-CM

## 2023-06-19 DIAGNOSIS — Z86.39 PERSONAL HISTORY OF OTHER ENDOCRINE, NUTRITIONAL AND METABOLIC DISEASE: ICD-10-CM

## 2023-06-19 DIAGNOSIS — Z90.3 ACQUIRED ABSENCE OF STOMACH [PART OF]: Chronic | ICD-10-CM

## 2023-06-19 DIAGNOSIS — Z88.1 ALLERGY STATUS TO OTHER ANTIBIOTIC AGENTS STATUS: ICD-10-CM

## 2023-06-19 DIAGNOSIS — Z79.82 LONG TERM (CURRENT) USE OF ASPIRIN: ICD-10-CM

## 2023-06-19 DIAGNOSIS — I25.10 ATHEROSCLEROTIC HEART DISEASE OF NATIVE CORONARY ARTERY WITHOUT ANGINA PECTORIS: ICD-10-CM

## 2023-06-19 LAB
ALBUMIN SERPL ELPH-MCNC: 4 G/DL — SIGNIFICANT CHANGE UP (ref 3.3–5)
ALP SERPL-CCNC: 67 U/L — SIGNIFICANT CHANGE UP (ref 40–120)
ALT FLD-CCNC: 22 U/L — SIGNIFICANT CHANGE UP (ref 10–45)
ANION GAP SERPL CALC-SCNC: 10 MMOL/L — SIGNIFICANT CHANGE UP (ref 5–17)
AST SERPL-CCNC: 15 U/L — SIGNIFICANT CHANGE UP (ref 10–40)
BILIRUB SERPL-MCNC: 0.7 MG/DL — SIGNIFICANT CHANGE UP (ref 0.2–1.2)
BLD GP AB SCN SERPL QL: NEGATIVE — SIGNIFICANT CHANGE UP
BUN SERPL-MCNC: 19 MG/DL — SIGNIFICANT CHANGE UP (ref 7–23)
CALCIUM SERPL-MCNC: 9.1 MG/DL — SIGNIFICANT CHANGE UP (ref 8.4–10.5)
CHLORIDE SERPL-SCNC: 104 MMOL/L — SIGNIFICANT CHANGE UP (ref 96–108)
CO2 SERPL-SCNC: 27 MMOL/L — SIGNIFICANT CHANGE UP (ref 22–31)
CREAT SERPL-MCNC: 0.93 MG/DL — SIGNIFICANT CHANGE UP (ref 0.5–1.3)
EGFR: 96 ML/MIN/1.73M2 — SIGNIFICANT CHANGE UP
GLUCOSE SERPL-MCNC: 190 MG/DL — HIGH (ref 70–99)
HCT VFR BLD CALC: 46.4 % — SIGNIFICANT CHANGE UP (ref 39–50)
HCT VFR BLD CALC: 48.7 % — SIGNIFICANT CHANGE UP (ref 39–50)
HGB BLD-MCNC: 16 G/DL — SIGNIFICANT CHANGE UP (ref 13–17)
HGB BLD-MCNC: 16.8 G/DL — SIGNIFICANT CHANGE UP (ref 13–17)
LIDOCAIN IGE QN: 20 U/L — SIGNIFICANT CHANGE UP (ref 7–60)
MCHC RBC-ENTMCNC: 30.6 PG — SIGNIFICANT CHANGE UP (ref 27–34)
MCHC RBC-ENTMCNC: 34.5 GM/DL — SIGNIFICANT CHANGE UP (ref 32–36)
MCV RBC AUTO: 88.7 FL — SIGNIFICANT CHANGE UP (ref 80–100)
NRBC # BLD: 0 /100 WBCS — SIGNIFICANT CHANGE UP (ref 0–0)
PLATELET # BLD AUTO: 235 K/UL — SIGNIFICANT CHANGE UP (ref 150–400)
POTASSIUM SERPL-MCNC: 3.6 MMOL/L — SIGNIFICANT CHANGE UP (ref 3.5–5.3)
POTASSIUM SERPL-SCNC: 3.6 MMOL/L — SIGNIFICANT CHANGE UP (ref 3.5–5.3)
PROT SERPL-MCNC: 6.7 G/DL — SIGNIFICANT CHANGE UP (ref 6–8.3)
RBC # BLD: 5.49 M/UL — SIGNIFICANT CHANGE UP (ref 4.2–5.8)
RBC # FLD: 13.5 % — SIGNIFICANT CHANGE UP (ref 10.3–14.5)
RH IG SCN BLD-IMP: POSITIVE — SIGNIFICANT CHANGE UP
RH IG SCN BLD-IMP: POSITIVE — SIGNIFICANT CHANGE UP
SODIUM SERPL-SCNC: 141 MMOL/L — SIGNIFICANT CHANGE UP (ref 135–145)
WBC # BLD: 10.48 K/UL — SIGNIFICANT CHANGE UP (ref 3.8–10.5)
WBC # FLD AUTO: 10.48 K/UL — SIGNIFICANT CHANGE UP (ref 3.8–10.5)

## 2023-06-19 PROCEDURE — 86850 RBC ANTIBODY SCREEN: CPT

## 2023-06-19 PROCEDURE — 83690 ASSAY OF LIPASE: CPT

## 2023-06-19 PROCEDURE — 96375 TX/PRO/DX INJ NEW DRUG ADDON: CPT

## 2023-06-19 PROCEDURE — 74177 CT ABD & PELVIS W/CONTRAST: CPT | Mod: MA

## 2023-06-19 PROCEDURE — 86901 BLOOD TYPING SEROLOGIC RH(D): CPT

## 2023-06-19 PROCEDURE — 74177 CT ABD & PELVIS W/CONTRAST: CPT | Mod: 26,MA

## 2023-06-19 PROCEDURE — 99284 EMERGENCY DEPT VISIT MOD MDM: CPT | Mod: 25

## 2023-06-19 PROCEDURE — 86900 BLOOD TYPING SEROLOGIC ABO: CPT

## 2023-06-19 PROCEDURE — 96374 THER/PROPH/DIAG INJ IV PUSH: CPT | Mod: XU

## 2023-06-19 PROCEDURE — 99285 EMERGENCY DEPT VISIT HI MDM: CPT

## 2023-06-19 PROCEDURE — 80053 COMPREHEN METABOLIC PANEL: CPT

## 2023-06-19 PROCEDURE — 85027 COMPLETE CBC AUTOMATED: CPT

## 2023-06-19 PROCEDURE — 36415 COLL VENOUS BLD VENIPUNCTURE: CPT

## 2023-06-19 PROCEDURE — 85018 HEMOGLOBIN: CPT

## 2023-06-19 PROCEDURE — 85014 HEMATOCRIT: CPT

## 2023-06-19 RX ORDER — FAMOTIDINE 10 MG/ML
20 INJECTION INTRAVENOUS ONCE
Refills: 0 | Status: COMPLETED | OUTPATIENT
Start: 2023-06-19 | End: 2023-06-19

## 2023-06-19 RX ORDER — FAMOTIDINE 10 MG/ML
1 INJECTION INTRAVENOUS
Qty: 28 | Refills: 0
Start: 2023-06-19 | End: 2023-07-02

## 2023-06-19 RX ORDER — PANTOPRAZOLE SODIUM 20 MG/1
40 TABLET, DELAYED RELEASE ORAL ONCE
Refills: 0 | Status: COMPLETED | OUTPATIENT
Start: 2023-06-19 | End: 2023-06-19

## 2023-06-19 RX ORDER — IOHEXOL 300 MG/ML
30 INJECTION, SOLUTION INTRAVENOUS ONCE
Refills: 0 | Status: COMPLETED | OUTPATIENT
Start: 2023-06-19 | End: 2023-06-19

## 2023-06-19 RX ADMIN — IOHEXOL 30 MILLILITER(S): 300 INJECTION, SOLUTION INTRAVENOUS at 01:26

## 2023-06-19 RX ADMIN — Medication 30 MILLILITER(S): at 01:26

## 2023-06-19 RX ADMIN — PANTOPRAZOLE SODIUM 40 MILLIGRAM(S): 20 TABLET, DELAYED RELEASE ORAL at 01:26

## 2023-06-19 RX ADMIN — FAMOTIDINE 20 MILLIGRAM(S): 10 INJECTION INTRAVENOUS at 01:26

## 2023-06-19 NOTE — ED PROVIDER NOTE - PATIENT PORTAL LINK FT
You can access the FollowMyHealth Patient Portal offered by [facility] by registering at the following website: [email or web address]. By joining [employer]’s FollowMyHealth portal, you will also be able to view your health information using other applications (apps) compatible with our system.

## 2023-06-19 NOTE — ED ADULT NURSE NOTE - BOWEL SOUNDS RLQ
Well Visit, Ages 25 to 48: present

## 2023-06-19 NOTE — ED PROVIDER NOTE - OBJECTIVE STATEMENT
57m hx HTN, DM, hld, surgicla history gastric sleeve and lap band. for 1 month he has been having upper abdominal pain immediately after eating. feels burning. tonight felt it and felt nauseated, went to the bathroom and vomited blood. no othe repisodes of hematemesis. regular BMs. had an endoscopy 1 month ago that was reportedly normal, sicne then has been having worsening heartburn symptoms. no sob.

## 2023-06-19 NOTE — ED ADULT NURSE NOTE - OBJECTIVE STATEMENT
Received via stretcher BIBEMS with chief complaints of epigastric pain since last night. Pt states "I feel terrible heart burn since last night. I took tums and Prilosec nothing helped. I went to a wedding tonight and I tried to relieve myself by making myself vomit by putting my finger down my throat and I vomitted bright red blood." Pt reports on aspirin 81mg daily for prophylaxis.     Patient AOX4, speaking full sentences, family @  bedside. Resps even and nonlabored. Moves all extremities. No obvious trauma/injury/deformity noted. Patient oriented to ED area. All needs attended. POC reviewed. Purposeful proactive hourly rounding in progress.

## 2023-06-19 NOTE — ED ADULT NURSE REASSESSMENT NOTE - NS ED NURSE REASSESS COMMENT FT1
Covering patient for RN Ciarra, patient is asleep on stretcher, easy to arouse by voice; breathing spontaneously, even and unlaboared, chest rise is visible and symmetrical. Treatment still in progress.

## 2023-06-19 NOTE — ED PROVIDER NOTE - CLINICAL SUMMARY MEDICAL DECISION MAKING FREE TEXT BOX
ct unremarkable, hgb trend unremarkable. asymptomatic in ED. will start on pepcid and maalox (patient on pantoprazole). has gi follow up. will follow with gi.

## 2023-06-19 NOTE — ED PROVIDER NOTE - NSICDXPASTMEDICALHX_GEN_ALL_CORE_FT
PAST MEDICAL HISTORY:  CAD (coronary artery disease)     Diabetes mellitus     HTN (hypertension)     Obesity

## 2023-06-19 NOTE — ED ADULT NURSE NOTE - DISCHARGE DATE/TIME
Anesthesia Evaluation     Patient summary reviewed and Nursing notes reviewed   history of anesthetic complications: prolonged sedation  NPO Solid Status: > 8 hours  NPO Liquid Status: > 2 hours           Airway   Mallampati: II  TM distance: >3 FB  Neck ROM: full  Dental - normal exam     Pulmonary - normal exam   (+) COPD,   Cardiovascular - normal exam  Exercise tolerance: poor (<4 METS) (Non ambulatory - uses wheelchair )    ECG reviewed  Rhythm: regular    (+) past MI , CAD, CABG >6 Months, hyperlipidemia,     ROS comment: 9/17:   Left ventricular systolic function is normal. Calculated EF = 57.4%. Estimated EF was in agreement with the calculated EF. Normal left ventricular cavity size noted. All left ventricular wall segments contract normally. Left ventricular wall thickness is consistent with mild concentric hypertrophy. Septal wall motion is abnormal, consistent with a post-operative state. Left ventricular diastolic function is normal.  · Normal left atrial size noted. Saline test results are negative.    10/18:  Sinus rhythm  Inferior infarct, old  No Change from Prior Tracing    Seen by dr. Read & 'cleared' for surgery    Neuro/Psych  (+) CVA residual symptoms, psychiatric history,       ROS Comment: R sided weakness  GI/Hepatic/Renal/Endo    (+)  GERD,  hypothyroidism,     Musculoskeletal     Abdominal  - normal exam    Bowel sounds: normal.   Substance History - negative use     OB/GYN negative ob/gyn ROS         Other   (+) arthritis                     Anesthesia Plan    ASA 3     general   (Extremely prolonged awakening w/ last GA (>48 hours).  )  intravenous induction   Anesthetic plan, all risks, benefits, and alternatives have been provided, discussed and informed consent has been obtained with: patient.    Plan discussed with CRNA and attending.      
19-Jun-2023 06:41

## 2023-06-19 NOTE — ED PROVIDER NOTE - PHYSICAL EXAMINATION
General: Awake, alert and oriented. No acute distress. Well developed, hydrated and nourished. Appears stated age.  Skin: Skin in warm, dry and intact without rashes or lesions. Appropriate color for ethnicity  HENMT: head normocephalic and atraumatic; bilateral external ears without swelling. no nasal discharge. moist oral mucosa. supple neck, trachea midline  EYES: Conjunctiva clear. nonicteric sclera. EOM intact, Eyelids are normal in appearance without swelling or lesions.  Cardiac: well perfused, s1, s2, rrr  Respiratory: breathing comfortably on room air. no audible wheezing or stridor, lungs ctab, no chest wall tenderness to palpation  Abdominal: nondistended, soft, nontender, soft and ealsy reducible ventrla hernia  MSK: Neck and back are without deformity, visible external skin changes, or signs of trauma. Curvature of the cervical, thoracic, and lumbar spine are within normal limits. no external signs of trauma. no apparent deficits in ROM of any extremity  Neurological: The patient is awake, alert and oriented to person, place, and time with normal speech. CN 2-12 grossly intact. no apparent deficits. Memory is normal and thought process is intact.  Psychiatric: Appropriate mood and affect. Good judgement and insight.

## 2023-06-21 ENCOUNTER — RX RENEWAL (OUTPATIENT)
Age: 58
End: 2023-06-21

## 2023-06-25 ENCOUNTER — RX RENEWAL (OUTPATIENT)
Age: 58
End: 2023-06-25

## 2023-07-12 ENCOUNTER — APPOINTMENT (OUTPATIENT)
Dept: HEART AND VASCULAR | Facility: CLINIC | Age: 58
End: 2023-07-12

## 2023-07-24 ENCOUNTER — APPOINTMENT (OUTPATIENT)
Dept: RADIOLOGY | Facility: HOSPITAL | Age: 58
End: 2023-07-24

## 2023-09-27 ENCOUNTER — RX RENEWAL (OUTPATIENT)
Age: 58
End: 2023-09-27

## 2023-10-03 ENCOUNTER — APPOINTMENT (OUTPATIENT)
Dept: HEART AND VASCULAR | Facility: CLINIC | Age: 58
End: 2023-10-03
Payer: MEDICAID

## 2023-10-03 PROCEDURE — 99214 OFFICE O/P EST MOD 30 MIN: CPT | Mod: 95

## 2023-11-16 ENCOUNTER — APPOINTMENT (OUTPATIENT)
Dept: HEART AND VASCULAR | Facility: CLINIC | Age: 58
End: 2023-11-16
Payer: MEDICAID

## 2023-11-16 VITALS
BODY MASS INDEX: 37.13 KG/M2 | SYSTOLIC BLOOD PRESSURE: 116 MMHG | OXYGEN SATURATION: 98 % | HEIGHT: 68 IN | HEART RATE: 62 BPM | WEIGHT: 245 LBS | DIASTOLIC BLOOD PRESSURE: 78 MMHG

## 2023-11-16 DIAGNOSIS — I10 ESSENTIAL (PRIMARY) HYPERTENSION: ICD-10-CM

## 2023-11-16 DIAGNOSIS — F17.200 NICOTINE DEPENDENCE, UNSPECIFIED, UNCOMPLICATED: ICD-10-CM

## 2023-11-16 DIAGNOSIS — I42.2 OTHER HYPERTROPHIC CARDIOMYOPATHY: ICD-10-CM

## 2023-11-16 PROCEDURE — 93306 TTE W/DOPPLER COMPLETE: CPT | Mod: 1L

## 2023-11-16 PROCEDURE — 93000 ELECTROCARDIOGRAM COMPLETE: CPT

## 2023-11-16 PROCEDURE — 99406 BEHAV CHNG SMOKING 3-10 MIN: CPT

## 2023-11-16 PROCEDURE — 99214 OFFICE O/P EST MOD 30 MIN: CPT | Mod: 25

## 2024-01-03 ENCOUNTER — RX RENEWAL (OUTPATIENT)
Age: 59
End: 2024-01-03

## 2024-02-06 ENCOUNTER — RX RENEWAL (OUTPATIENT)
Age: 59
End: 2024-02-06

## 2024-02-06 RX ORDER — ASPIRIN 81 MG/1
81 TABLET, FILM COATED ORAL
Qty: 30 | Refills: 3 | Status: ACTIVE | COMMUNITY
Start: 2023-06-25 | End: 1900-01-01

## 2024-02-12 ENCOUNTER — RX RENEWAL (OUTPATIENT)
Age: 59
End: 2024-02-12

## 2024-03-11 ENCOUNTER — APPOINTMENT (OUTPATIENT)
Dept: HEART AND VASCULAR | Facility: CLINIC | Age: 59
End: 2024-03-11
Payer: MEDICAID

## 2024-03-11 VITALS
HEART RATE: 64 BPM | WEIGHT: 250 LBS | HEIGHT: 68 IN | BODY MASS INDEX: 37.89 KG/M2 | SYSTOLIC BLOOD PRESSURE: 136 MMHG | DIASTOLIC BLOOD PRESSURE: 74 MMHG | OXYGEN SATURATION: 94 %

## 2024-03-11 DIAGNOSIS — E78.2 MIXED HYPERLIPIDEMIA: ICD-10-CM

## 2024-03-11 DIAGNOSIS — I25.10 ATHEROSCLEROTIC HEART DISEASE OF NATIVE CORONARY ARTERY W/OUT ANGINA PECTORIS: ICD-10-CM

## 2024-03-11 DIAGNOSIS — I73.9 PERIPHERAL VASCULAR DISEASE, UNSPECIFIED: ICD-10-CM

## 2024-03-11 PROCEDURE — 93000 ELECTROCARDIOGRAM COMPLETE: CPT

## 2024-03-11 PROCEDURE — 99406 BEHAV CHNG SMOKING 3-10 MIN: CPT

## 2024-03-11 PROCEDURE — 99214 OFFICE O/P EST MOD 30 MIN: CPT | Mod: 25

## 2024-03-11 NOTE — PHYSICAL EXAM
[Well Developed] : well developed [Well Nourished] : well nourished [No Acute Distress] : no acute distress [Normal Conjunctiva] : normal conjunctiva [Normal Venous Pressure] : normal venous pressure [No Carotid Bruit] : no carotid bruit [Normal S1, S2] : normal S1, S2 [No Murmur] : no murmur [Clear Lung Fields] : clear lung fields [Good Air Entry] : good air entry [No Respiratory Distress] : no respiratory distress  [Soft] : abdomen soft [Non Tender] : non-tender [Normal Bowel Sounds] : normal bowel sounds [Normal Gait] : normal gait [No Edema] : no edema [No Cyanosis] : no cyanosis [Venous varicosities] : venous varicosities [No Rash] : no rash [Moves all extremities] : moves all extremities [Normal Speech] : normal speech [Alert and Oriented] : alert and oriented [de-identified] : large umbilical/ventral hernia

## 2024-03-11 NOTE — DISCUSSION/SUMMARY
[FreeTextEntry1] : EKG:NSR needs to stop smoking- has claudication- states vascular w/u unremarakable and told of l/S disease- continue walking continue asa Toprol for CAD;lipitor + Lovazza for HLD;lisinopril for HPTN

## 2024-03-25 ENCOUNTER — RX RENEWAL (OUTPATIENT)
Age: 59
End: 2024-03-25

## 2024-05-01 ENCOUNTER — RX RENEWAL (OUTPATIENT)
Age: 59
End: 2024-05-01

## 2024-05-01 RX ORDER — ASPIRIN 81 MG/1
81 TABLET, DELAYED RELEASE ORAL DAILY
Qty: 30 | Refills: 4 | Status: ACTIVE | COMMUNITY
Start: 2024-05-01 | End: 1900-01-01

## 2024-05-01 RX ORDER — LISINOPRIL 5 MG/1
5 TABLET ORAL
Qty: 30 | Refills: 3 | Status: ACTIVE | COMMUNITY
Start: 2020-08-10 | End: 1900-01-01

## 2024-06-09 ENCOUNTER — RX RENEWAL (OUTPATIENT)
Age: 59
End: 2024-06-09

## 2024-06-09 RX ORDER — ATORVASTATIN CALCIUM 20 MG/1
20 TABLET, FILM COATED ORAL
Qty: 30 | Refills: 2 | Status: ACTIVE | COMMUNITY
Start: 2017-10-02 | End: 1900-01-01

## 2024-07-29 ENCOUNTER — RX RENEWAL (OUTPATIENT)
Age: 59
End: 2024-07-29

## 2024-08-19 ENCOUNTER — RX RENEWAL (OUTPATIENT)
Age: 59
End: 2024-08-19

## 2024-09-09 ENCOUNTER — APPOINTMENT (OUTPATIENT)
Dept: HEART AND VASCULAR | Facility: CLINIC | Age: 59
End: 2024-09-09
Payer: MEDICAID

## 2024-09-09 ENCOUNTER — NON-APPOINTMENT (OUTPATIENT)
Age: 59
End: 2024-09-09

## 2024-09-09 VITALS
HEART RATE: 63 BPM | SYSTOLIC BLOOD PRESSURE: 133 MMHG | BODY MASS INDEX: 37.89 KG/M2 | DIASTOLIC BLOOD PRESSURE: 72 MMHG | HEIGHT: 68 IN | TEMPERATURE: 97.3 F | WEIGHT: 250 LBS | OXYGEN SATURATION: 97 %

## 2024-09-09 DIAGNOSIS — I73.9 PERIPHERAL VASCULAR DISEASE, UNSPECIFIED: ICD-10-CM

## 2024-09-09 DIAGNOSIS — I25.10 ATHEROSCLEROTIC HEART DISEASE OF NATIVE CORONARY ARTERY W/OUT ANGINA PECTORIS: ICD-10-CM

## 2024-09-09 DIAGNOSIS — I10 ESSENTIAL (PRIMARY) HYPERTENSION: ICD-10-CM

## 2024-09-09 DIAGNOSIS — E78.2 MIXED HYPERLIPIDEMIA: ICD-10-CM

## 2024-09-09 DIAGNOSIS — R00.2 PALPITATIONS: ICD-10-CM

## 2024-09-09 PROCEDURE — 99406 BEHAV CHNG SMOKING 3-10 MIN: CPT

## 2024-09-09 PROCEDURE — 93306 TTE W/DOPPLER COMPLETE: CPT

## 2024-09-09 PROCEDURE — 99214 OFFICE O/P EST MOD 30 MIN: CPT | Mod: 25

## 2024-09-09 PROCEDURE — 93000 ELECTROCARDIOGRAM COMPLETE: CPT

## 2024-09-09 NOTE — DISCUSSION/SUMMARY
[FreeTextEntry1] : EKG:NSR reviewed labs on his phone continue lipitor for HLD;, asa for mild CAD; monitor for BP states was at dentist and had fluttering- has been feeling it quite often last few weeks- will get monitor needs to stop smoking!

## 2024-09-09 NOTE — PHYSICAL EXAM
[Well Developed] : well developed [Well Nourished] : well nourished [No Acute Distress] : no acute distress [Normal Conjunctiva] : normal conjunctiva [Normal Venous Pressure] : normal venous pressure [No Carotid Bruit] : no carotid bruit [Normal S1, S2] : normal S1, S2 [No Murmur] : no murmur [Clear Lung Fields] : clear lung fields [Good Air Entry] : good air entry [No Respiratory Distress] : no respiratory distress  [Soft] : abdomen soft [Non Tender] : non-tender [Normal Bowel Sounds] : normal bowel sounds [Normal Gait] : normal gait [No Edema] : no edema [No Cyanosis] : no cyanosis [Venous varicosities] : venous varicosities [No Rash] : no rash [Moves all extremities] : moves all extremities [Normal Speech] : normal speech [Alert and Oriented] : alert and oriented [de-identified] : large umbilical/ventral hernia

## 2024-09-30 NOTE — ED ADULT TRIAGE NOTE - BP NONINVASIVE DIASTOLIC (MM HG)
Flu shot today.    Add in a can of pediasure each day to see if her weight improves.    With respect to her mouth, it sounds like she has oral ulcers.  If bothering, try some over-the-counter maalox (1 tsp) three times daily if bothering her.      Otherwise,  may try saline rinses.     Let me know if the rash persists beyond 5-7 days or if she is unable to eat because of it and we could consider a medicated mouthwash.    Carmelo Armstrong MD  Internal Medicine and Pediatrics     Patient Education    BRIGHT FUTURES HANDOUT- PARENT  6 YEAR VISIT  Here are some suggestions from [employer] experts that may be of value to your family.     HOW YOUR FAMILY IS DOING  Spend time with your child. Hug and praise him.  Help your child do things for himself.  Help your child deal with conflict.  If you are worried about your living or food situation, talk with us. Community agencies and programs such as [employer] can also provide information and assistance.  Don t smoke or use e-cigarettes. Keep your home and car smoke-free. Tobacco-free spaces keep children healthy.  Don t use alcohol or drugs. If you re worried about a family member s use, let us know, or reach out to local or online resources that can help.    STAYING HEALTHY  Help your child brush his teeth twice a day  After breakfast  Before bed  Use a pea-sized amount of toothpaste with fluoride.  Help your child floss his teeth once a day.  Your child should visit the dentist at least twice a year.  Help your child be a healthy eater by  Providing healthy foods, such as vegetables, fruits, lean protein, and whole grains  Eating together as a family  Being a role model in what you eat  Buy fat-free milk and low-fat dairy foods. Encourage 2 to 3 servings each day.  Limit candy, soft drinks, juice, and sugary foods.  Make sure your child is active for 1 hour or more daily.  Don t put a TV in your child s bedroom.  Consider making a family media plan. It helps you make rules for  media use and balance screen time with other activities, including exercise.    FAMILY RULES AND ROUTINES  Family routines create a sense of safety and security for your child.  Teach your child what is right and what is wrong.  Give your child chores to do and expect them to be done.  Use discipline to teach, not to punish.  Help your child deal with anger. Be a role model.  Teach your child to walk away when she is angry and do something else to calm down, such as playing or reading.    READY FOR SCHOOL  Talk to your child about school.  Read books with your child about starting school.  Take your child to see the school and meet the teacher.  Help your child get ready to learn. Feed her a healthy breakfast and give her regular bedtimes so she gets at least 10 to 11 hours of sleep.  Make sure your child goes to a safe place after school.  If your child has disabilities or special health care needs, be active in the Individualized Education Program process.    SAFETY  Your child should always ride in the back seat (until at least 13 years of age) and use a forward-facing car safety seat or belt-positioning booster seat.  Teach your child how to safely cross the street and ride the school bus. Children are not ready to cross the street alone until 10 years or older.  Provide a properly fitting helmet and safety gear for riding scooters, biking, skating, in-line skating, skiing, snowboarding, and horseback riding.  Make sure your child learns to swim. Never let your child swim alone.  Use a hat, sun protection clothing, and sunscreen with SPF of 15 or higher on his exposed skin. Limit time outside when the sun is strongest (11:00 am-3:00 pm).  Teach your child about how to be safe with other adults.  No adult should ask a child to keep secrets from parents.  No adult should ask to see a child s private parts.  No adult should ask a child for help with the adult s own private parts.  Have working smoke and carbon  monoxide alarms on every floor. Test them every month and change the batteries every year. Make a family escape plan in case of fire in your home.  If it is necessary to keep a gun in your home, store it unloaded and locked with the ammunition locked separately from the gun.  Ask if there are guns in homes where your child plays. If so, make sure they are stored safely.        Helpful Resources:  Family Media Use Plan: www.healthychildren.org/MediaUsePlan  Smoking Quit Line: 640.789.7056 Information About Car Safety Seats: www.safercar.gov/parents  Toll-free Auto Safety Hotline: 576.147.7526  Consistent with Bright Futures: Guidelines for Health Supervision of Infants, Children, and Adolescents, 4th Edition  For more information, go to https://brightfutures.aap.org.              73

## 2024-10-17 NOTE — ED PROVIDER NOTE - NS ED MD DISPO ADMIT [facility]
"Anesthesia Transfer of Care Note    Patient: Nneka Gomes    Procedure(s) Performed: * EGD *    Patient location: GI    Anesthesia Type: MAC    Transport from OR: Transported from OR on room air with adequate spontaneous ventilation    Post pain: adequate analgesia    Post assessment: no apparent anesthetic complications    Post vital signs: stable    Level of consciousness: awake and responds to stimulation    Nausea/Vomiting: no nausea/vomiting    Complications: none    Transfer of care protocol was followed      Last vitals: Visit Vitals  /76   Pulse 72   Temp 36.1 °C (97 °F)   Resp 14   Ht 5' 6" (1.676 m)   Wt 89.8 kg (198 lb)   SpO2 96%   Breastfeeding No   BMI 31.96 kg/m²     "
MEDICINE

## 2025-01-13 ENCOUNTER — NON-APPOINTMENT (OUTPATIENT)
Age: 60
End: 2025-01-13

## 2025-01-13 ENCOUNTER — APPOINTMENT (OUTPATIENT)
Facility: CLINIC | Age: 60
End: 2025-01-13
Payer: MEDICAID

## 2025-01-13 VITALS
HEIGHT: 68 IN | TEMPERATURE: 97.2 F | DIASTOLIC BLOOD PRESSURE: 82 MMHG | RESPIRATION RATE: 15 BRPM | BODY MASS INDEX: 39.56 KG/M2 | SYSTOLIC BLOOD PRESSURE: 152 MMHG | HEART RATE: 71 BPM | WEIGHT: 261 LBS | OXYGEN SATURATION: 99 %

## 2025-01-13 DIAGNOSIS — K43.2 INCISIONAL HERNIA W/OUT OBSTRUCTION OR GANGRENE: ICD-10-CM

## 2025-01-13 PROCEDURE — 99214 OFFICE O/P EST MOD 30 MIN: CPT

## 2025-01-17 DIAGNOSIS — E66.01 MORBID (SEVERE) OBESITY DUE TO EXCESS CALORIES: ICD-10-CM

## 2025-01-17 DIAGNOSIS — E11.9 TYPE 2 DIABETES MELLITUS W/OUT COMPLICATIONS: ICD-10-CM

## 2025-01-17 DIAGNOSIS — E78.5 HYPERLIPIDEMIA, UNSPECIFIED: ICD-10-CM

## 2025-01-22 ENCOUNTER — RX RENEWAL (OUTPATIENT)
Age: 60
End: 2025-01-22

## 2025-03-17 ENCOUNTER — APPOINTMENT (OUTPATIENT)
Dept: HEART AND VASCULAR | Facility: CLINIC | Age: 60
End: 2025-03-17
Payer: MEDICAID

## 2025-03-17 ENCOUNTER — NON-APPOINTMENT (OUTPATIENT)
Age: 60
End: 2025-03-17

## 2025-03-17 VITALS
OXYGEN SATURATION: 97 % | HEIGHT: 68 IN | SYSTOLIC BLOOD PRESSURE: 127 MMHG | WEIGHT: 261 LBS | DIASTOLIC BLOOD PRESSURE: 76 MMHG | BODY MASS INDEX: 39.56 KG/M2 | HEART RATE: 61 BPM

## 2025-03-17 DIAGNOSIS — F17.210 NICOTINE DEPENDENCE, CIGARETTES, UNCOMPLICATED: ICD-10-CM

## 2025-03-17 DIAGNOSIS — I10 ESSENTIAL (PRIMARY) HYPERTENSION: ICD-10-CM

## 2025-03-17 DIAGNOSIS — R00.2 PALPITATIONS: ICD-10-CM

## 2025-03-17 DIAGNOSIS — F17.200 NICOTINE DEPENDENCE, UNSPECIFIED, UNCOMPLICATED: ICD-10-CM

## 2025-03-17 DIAGNOSIS — E78.5 HYPERLIPIDEMIA, UNSPECIFIED: ICD-10-CM

## 2025-03-17 DIAGNOSIS — I73.9 PERIPHERAL VASCULAR DISEASE, UNSPECIFIED: ICD-10-CM

## 2025-03-17 DIAGNOSIS — E78.2 MIXED HYPERLIPIDEMIA: ICD-10-CM

## 2025-03-17 DIAGNOSIS — I25.10 ATHEROSCLEROTIC HEART DISEASE OF NATIVE CORONARY ARTERY W/OUT ANGINA PECTORIS: ICD-10-CM

## 2025-03-17 PROCEDURE — 93000 ELECTROCARDIOGRAM COMPLETE: CPT

## 2025-03-17 PROCEDURE — 99406 BEHAV CHNG SMOKING 3-10 MIN: CPT

## 2025-03-17 PROCEDURE — 99214 OFFICE O/P EST MOD 30 MIN: CPT | Mod: 25

## 2025-03-19 ENCOUNTER — APPOINTMENT (OUTPATIENT)
Dept: BARIATRICS | Facility: CLINIC | Age: 60
End: 2025-03-19
Payer: MEDICAID

## 2025-03-19 VITALS
WEIGHT: 257 LBS | TEMPERATURE: 98.6 F | OXYGEN SATURATION: 98 % | HEIGHT: 68 IN | BODY MASS INDEX: 38.95 KG/M2 | SYSTOLIC BLOOD PRESSURE: 126 MMHG | HEART RATE: 70 BPM | DIASTOLIC BLOOD PRESSURE: 77 MMHG

## 2025-03-19 DIAGNOSIS — K43.2 INCISIONAL HERNIA W/OUT OBSTRUCTION OR GANGRENE: ICD-10-CM

## 2025-03-19 DIAGNOSIS — Z98.84 BARIATRIC SURGERY STATUS: ICD-10-CM

## 2025-03-19 PROCEDURE — 99215 OFFICE O/P EST HI 40 MIN: CPT

## 2025-04-01 ENCOUNTER — RX RENEWAL (OUTPATIENT)
Age: 60
End: 2025-04-01

## 2025-04-02 ENCOUNTER — APPOINTMENT (OUTPATIENT)
Dept: CT IMAGING | Facility: CLINIC | Age: 60
End: 2025-04-02
Payer: MEDICAID

## 2025-04-02 ENCOUNTER — OUTPATIENT (OUTPATIENT)
Dept: OUTPATIENT SERVICES | Facility: HOSPITAL | Age: 60
LOS: 1 days | End: 2025-04-02

## 2025-04-02 DIAGNOSIS — Z90.3 ACQUIRED ABSENCE OF STOMACH [PART OF]: Chronic | ICD-10-CM

## 2025-04-02 PROCEDURE — 75574 CT ANGIO HRT W/3D IMAGE: CPT | Mod: 26

## 2025-04-10 ENCOUNTER — OUTPATIENT (OUTPATIENT)
Dept: OUTPATIENT SERVICES | Facility: HOSPITAL | Age: 60
LOS: 1 days | End: 2025-04-10
Payer: COMMERCIAL

## 2025-04-10 ENCOUNTER — NON-APPOINTMENT (OUTPATIENT)
Age: 60
End: 2025-04-10

## 2025-04-10 ENCOUNTER — RESULT REVIEW (OUTPATIENT)
Age: 60
End: 2025-04-10

## 2025-04-10 DIAGNOSIS — R07.89 OTHER CHEST PAIN: ICD-10-CM

## 2025-04-10 DIAGNOSIS — I25.10 ATHEROSCLEROTIC HEART DISEASE OF NATIVE CORONARY ARTERY WITHOUT ANGINA PECTORIS: ICD-10-CM

## 2025-04-10 DIAGNOSIS — Z90.3 ACQUIRED ABSENCE OF STOMACH [PART OF]: Chronic | ICD-10-CM

## 2025-04-10 PROCEDURE — 78452 HT MUSCLE IMAGE SPECT MULT: CPT

## 2025-04-10 PROCEDURE — A9500: CPT

## 2025-04-10 PROCEDURE — 78452 HT MUSCLE IMAGE SPECT MULT: CPT | Mod: 26

## 2025-04-10 PROCEDURE — 82962 GLUCOSE BLOOD TEST: CPT

## 2025-04-10 PROCEDURE — 93017 CV STRESS TEST TRACING ONLY: CPT

## 2025-04-10 PROCEDURE — 93018 CV STRESS TEST I&R ONLY: CPT

## 2025-04-10 PROCEDURE — 93016 CV STRESS TEST SUPVJ ONLY: CPT

## 2025-04-16 VITALS
TEMPERATURE: 97 F | DIASTOLIC BLOOD PRESSURE: 72 MMHG | HEIGHT: 68 IN | SYSTOLIC BLOOD PRESSURE: 136 MMHG | HEART RATE: 62 BPM | RESPIRATION RATE: 14 BRPM | OXYGEN SATURATION: 98 % | WEIGHT: 255.07 LBS

## 2025-04-16 NOTE — H&P ADULT - HISTORY OF PRESENT ILLNESS
Cardiologist: Dr. Tamayo   Escort:  Pharmacy:    56 yo M smoker, PMH HTN, DM, HLD, MORALES, surgical history gastric sleeve and lap band, presents for to outpatient cardiologist for routine follow up. Pt reports palpitations yesterday. He does endorse back pain and bilateral LE pain as well. Denies CP, SOB, orthopnea, PND, edema, n/v or other symptoms.     CCTA 4/2/25: CA score severe at 456 agatston units, moderate to severe stenosis in pLAD. Calcific plaque obscures lumen branching D2, RI, OM1 and mid-distal left circumflex artery precluding stenosis. Remaining segments nonobstructive.  Echo 9/9/24: LVEF 67%, mild LVH, no significant VHD, normal LV diastolic function, normal RVSF    In light of risk factors, CCS __ anginal symptoms and abnormal CCTA, referred for cardiac cath with possible PCI if clinically indicated.    Cardiologist: Dr. Tamayo   Pharmacy: [facility]  Escort: wife    57M current smoker, PMH HTN, T2DM, HLD, MORALES, surgical hx of gastric sleeve ~2016 years ago at [facility] with Dr. Steward c/b SBO s/p resection and abdominal ventral hernia, who presented to his outpatient cardiologist for routine follow up. Pt reports intermittent, short episodes of palpitations, . He does endorse back pain and bilateral LE pain as well. Pt currently denies any CP, SOB, BROWN, orthopnea, PND, palpitations, dizziness, lightheadedness, syncope, diaphoresis, LE edema, N/V/D, hematochezia, melena, hematuria, hematemesis, fever, chills, URI symptoms.     CCTA 4/2/25: CA score severe at 456 agatston units, moderate to severe stenosis in pLAD. Calcific plaque obscures lumen branching D2, RI, OM1 and mid-distal left circumflex artery precluding stenosis. Remaining segments nonobstructive.  TTE 9/9/24: LVEF 67%, mild LVH, no significant VHD, normal LV diastolic function, normal RVSF    In light of risk factors, CCS II anginal symptoms and abnormal CCTA, referred for cardiac cath with possible PCI if clinically indicated.    Cardiologist: Dr. Tamayo   Pharmacy: [facility]  Escort: wife    57M current smoker, PMH HTN, T2DM, HLD, MORALES, surgical hx of gastric sleeve ~2016 years ago at [facility] with Dr. Steward c/b SBO s/p resection and abdominal ventral hernia, who presented to his outpatient cardiologist for routine follow up. Pt reports intermittent, short episodes of palpitations, as well as occasional dyspnea on exertion w/ moderate exertion. Per MD note, pt also endorses back pain and bilateral LE pain as well. Pt reports compliance w/ home ASA 81 mg PO qd, no missed doses this week, last dose today AM. Pt currently denies any orthopnea, PND, palpitations, dizziness, lightheadedness, syncope, diaphoresis, LE edema, N/V/D, hematochezia, melena, hematuria, hematemesis, fever, chills, URI symptoms.     CCTA 4/2/25: CA score severe at 456 agatston units, moderate to severe stenosis in pLAD. Calcific plaque obscures lumen branching D2, RI, OM1 and mid-distal left circumflex artery precluding stenosis. Remaining segments nonobstructive.  TTE 9/9/24: LVEF 67%, mild LVH, no significant VHD, normal LV diastolic function, normal RVSF    In light of risk factors, CCS II anginal symptoms and abnormal CCTA, referred for cardiac cath with possible PCI if clinically indicated.

## 2025-04-16 NOTE — H&P ADULT - ASSESSMENT
57M current smoker, PMH HTN, T2DM, HLD, MORALES, surgical hx of gastric sleeve ~2016 years ago at [facility] with Dr. Steward c/b SBO s/p resection and abdominal ventral hernia, who presented to his outpatient cardiologist for routine follow up. Pt reports intermittent, short episodes of palpitations, as well as occasional dyspnea on exertion w/ moderate exertion. In light of risk factors, CCS II anginal symptoms and abnormal CCTA, referred for cardiac cath with possible PCI if clinically indicated.     ASA II       Mallampati II  Patient is a candidate for sedation: yes  Sedation: moderate    - Load: No ASA given here, pt took home dose ASA 81 mg PO x1 today AM. Plavix 600 mg PO x1. H/H 16.3/46.5, Plt 190 (WNL). No active sxs bleeding.  - IVF:  bolus x1, followed by NS 75 cc/hr x 2 hours per hydration protocol. Euvolemic on exam. Normal EF. Cr 0.74 (WNL)  - K 4.1 - no repletion necessary, Mg 1.8 - repletion w/ mag oxide 800 mg PO x1  - Ordered 80% of home insulin glargine + mISS  - Informed consent obtained and placed in chart    Risks & benefits of procedure and alternative therapy have been explained to the patient including but not limited to: allergic reaction, bleeding w/possible need for blood transfusion, infection, renal and vascular compromise, limb damage, arrhythmia, stroke, vessel dissection/perforation, Myocardial infarction, emergent CABG.  57M current smoker, PMH HTN, T2DM, HLD, MORALES, surgical hx of gastric sleeve ~2016 years ago at [facility] with Dr. Steward c/b SBO s/p resection and abdominal ventral hernia, who presented to his outpatient cardiologist for routine follow up. Pt reports intermittent, short episodes of palpitations, as well as occasional dyspnea on exertion w/ moderate exertion. In light of risk factors, CCS II anginal symptoms and abnormal CCTA, referred for cardiac cath with possible PCI if clinically indicated.     ASA II       Mallampati II  Patient is a candidate for sedation: yes  Sedation: moderate    - Load: No ASA given here, pt took home dose ASA 81 mg PO x1 today AM. Plavix 600 mg PO x1. H/H 16.3/46.5, Plt 190 (WNL). No active sxs bleeding.  - IVF:  bolus x1, followed by NS 75 cc/hr x 2 hours per hydration protocol. Euvolemic on exam. Normal EF. Cr 0.74 (WNL)  - K 4.1 - no repletion necessary, Mg 1.8 - repletion w/ mag oxide 800 mg PO x1  - Initiated mISS pre-cath  - Informed consent obtained and placed in chart    Risks & benefits of procedure and alternative therapy have been explained to the patient including but not limited to: allergic reaction, bleeding w/possible need for blood transfusion, infection, renal and vascular compromise, limb damage, arrhythmia, stroke, vessel dissection/perforation, Myocardial infarction, emergent CABG.

## 2025-04-16 NOTE — H&P ADULT - NSHPLABSRESULTS_GEN_ALL_CORE
16.3   8.09  )-----------( 190      ( 21 Apr 2025 08:34 )             46.5     04-21    139  |  104  |  17  ----------------------------<  151[H]  4.1   |  26  |  0.74    Ca    9.2      21 Apr 2025 08:48  Mg     1.8     04-21    TPro  6.4  /  Alb  3.9  /  TBili  0.7  /  DBili  x   /  AST  15  /  ALT  23  /  AlkPhos  48  04-21    PT/INR - ( 21 Apr 2025 08:34 )   PT: 10.2 sec;   INR: 0.87      PTT - ( 21 Apr 2025 08:34 )  PTT:29.4 sec    CARDIAC MARKERS ( 21 Apr 2025 08:48 )  x     / x     / x     / x     / 3.1 ng/mL    EKG: sinus giorgio @ 58 bpm, no sig ST-T changes

## 2025-04-21 ENCOUNTER — OUTPATIENT (OUTPATIENT)
Dept: OUTPATIENT SERVICES | Facility: HOSPITAL | Age: 60
LOS: 1 days | Discharge: HOME | End: 2025-04-21
Payer: COMMERCIAL

## 2025-04-21 ENCOUNTER — TRANSCRIPTION ENCOUNTER (OUTPATIENT)
Age: 60
End: 2025-04-21

## 2025-04-21 DIAGNOSIS — Z90.3 ACQUIRED ABSENCE OF STOMACH [PART OF]: Chronic | ICD-10-CM

## 2025-04-21 LAB
A1C WITH ESTIMATED AVERAGE GLUCOSE RESULT: 7.7 % — HIGH (ref 4–5.6)
ALBUMIN SERPL ELPH-MCNC: 3.9 G/DL — SIGNIFICANT CHANGE UP (ref 3.3–5)
ALP SERPL-CCNC: 48 U/L — SIGNIFICANT CHANGE UP (ref 40–120)
ALT FLD-CCNC: 23 U/L — SIGNIFICANT CHANGE UP (ref 10–45)
ANION GAP SERPL CALC-SCNC: 9 MMOL/L — SIGNIFICANT CHANGE UP (ref 5–17)
APTT BLD: 29.4 SEC — SIGNIFICANT CHANGE UP (ref 24.5–35.6)
AST SERPL-CCNC: 15 U/L — SIGNIFICANT CHANGE UP (ref 10–40)
BASOPHILS # BLD AUTO: 0.03 K/UL — SIGNIFICANT CHANGE UP (ref 0–0.2)
BASOPHILS NFR BLD AUTO: 0.4 % — SIGNIFICANT CHANGE UP (ref 0–2)
BILIRUB SERPL-MCNC: 0.7 MG/DL — SIGNIFICANT CHANGE UP (ref 0.2–1.2)
BUN SERPL-MCNC: 17 MG/DL — SIGNIFICANT CHANGE UP (ref 7–23)
CALCIUM SERPL-MCNC: 9.2 MG/DL — SIGNIFICANT CHANGE UP (ref 8.4–10.5)
CHLORIDE SERPL-SCNC: 104 MMOL/L — SIGNIFICANT CHANGE UP (ref 96–108)
CHOLEST SERPL-MCNC: 129 MG/DL — SIGNIFICANT CHANGE UP
CK MB CFR SERPL CALC: 3.1 NG/ML — SIGNIFICANT CHANGE UP (ref 0–6.7)
CK SERPL-CCNC: 53 U/L — SIGNIFICANT CHANGE UP (ref 30–200)
CO2 SERPL-SCNC: 26 MMOL/L — SIGNIFICANT CHANGE UP (ref 22–31)
CREAT SERPL-MCNC: 0.74 MG/DL — SIGNIFICANT CHANGE UP (ref 0.5–1.3)
EGFR: 104 ML/MIN/1.73M2 — SIGNIFICANT CHANGE UP
EGFR: 104 ML/MIN/1.73M2 — SIGNIFICANT CHANGE UP
EOSINOPHIL # BLD AUTO: 0.11 K/UL — SIGNIFICANT CHANGE UP (ref 0–0.5)
EOSINOPHIL NFR BLD AUTO: 1.4 % — SIGNIFICANT CHANGE UP (ref 0–6)
ESTIMATED AVERAGE GLUCOSE: 174 MG/DL — HIGH (ref 68–114)
GLUCOSE SERPL-MCNC: 151 MG/DL — HIGH (ref 70–99)
HCT VFR BLD CALC: 46.5 % — SIGNIFICANT CHANGE UP (ref 39–50)
HDLC SERPL-MCNC: 29 MG/DL — LOW
HGB BLD-MCNC: 16.3 G/DL — SIGNIFICANT CHANGE UP (ref 13–17)
IMM GRANULOCYTES NFR BLD AUTO: 0.4 % — SIGNIFICANT CHANGE UP (ref 0–0.9)
INR BLD: 0.87 — SIGNIFICANT CHANGE UP (ref 0.85–1.16)
LDLC SERPL-MCNC: 73 MG/DL — SIGNIFICANT CHANGE UP
LIPID PNL WITH DIRECT LDL SERPL: 73 MG/DL — SIGNIFICANT CHANGE UP
LYMPHOCYTES # BLD AUTO: 2.26 K/UL — SIGNIFICANT CHANGE UP (ref 1–3.3)
LYMPHOCYTES # BLD AUTO: 27.9 % — SIGNIFICANT CHANGE UP (ref 13–44)
MCHC RBC-ENTMCNC: 31 PG — SIGNIFICANT CHANGE UP (ref 27–34)
MCHC RBC-ENTMCNC: 35.1 G/DL — SIGNIFICANT CHANGE UP (ref 32–36)
MCV RBC AUTO: 88.4 FL — SIGNIFICANT CHANGE UP (ref 80–100)
MONOCYTES # BLD AUTO: 0.58 K/UL — SIGNIFICANT CHANGE UP (ref 0–0.9)
MONOCYTES NFR BLD AUTO: 7.2 % — SIGNIFICANT CHANGE UP (ref 2–14)
NEUTROPHILS # BLD AUTO: 5.08 K/UL — SIGNIFICANT CHANGE UP (ref 1.8–7.4)
NEUTROPHILS NFR BLD AUTO: 62.7 % — SIGNIFICANT CHANGE UP (ref 43–77)
NONHDLC SERPL-MCNC: 100 MG/DL — SIGNIFICANT CHANGE UP
NRBC BLD AUTO-RTO: 0 /100 WBCS — SIGNIFICANT CHANGE UP (ref 0–0)
PLATELET # BLD AUTO: 190 K/UL — SIGNIFICANT CHANGE UP (ref 150–400)
POTASSIUM SERPL-MCNC: 4.1 MMOL/L — SIGNIFICANT CHANGE UP (ref 3.5–5.3)
POTASSIUM SERPL-SCNC: 4.1 MMOL/L — SIGNIFICANT CHANGE UP (ref 3.5–5.3)
PROT SERPL-MCNC: 6.4 G/DL — SIGNIFICANT CHANGE UP (ref 6–8.3)
PROTHROM AB SERPL-ACNC: 10.2 SEC — SIGNIFICANT CHANGE UP (ref 9.9–13.4)
RBC # BLD: 5.26 M/UL — SIGNIFICANT CHANGE UP (ref 4.2–5.8)
RBC # FLD: 13.8 % — SIGNIFICANT CHANGE UP (ref 10.3–14.5)
SODIUM SERPL-SCNC: 139 MMOL/L — SIGNIFICANT CHANGE UP (ref 135–145)
TRIGL SERPL-MCNC: 157 MG/DL — HIGH
WBC # BLD: 8.09 K/UL — SIGNIFICANT CHANGE UP (ref 3.8–10.5)
WBC # FLD AUTO: 8.09 K/UL — SIGNIFICANT CHANGE UP (ref 3.8–10.5)

## 2025-04-21 PROCEDURE — C1894: CPT

## 2025-04-21 PROCEDURE — 99152 MOD SED SAME PHYS/QHP 5/>YRS: CPT

## 2025-04-21 PROCEDURE — 80053 COMPREHEN METABOLIC PANEL: CPT

## 2025-04-21 PROCEDURE — 80061 LIPID PANEL: CPT

## 2025-04-21 PROCEDURE — 85610 PROTHROMBIN TIME: CPT

## 2025-04-21 PROCEDURE — 85730 THROMBOPLASTIN TIME PARTIAL: CPT

## 2025-04-21 PROCEDURE — 0523T NTRAPX C FFR W/3D FUNCJL MAP: CPT

## 2025-04-21 PROCEDURE — 83735 ASSAY OF MAGNESIUM: CPT

## 2025-04-21 PROCEDURE — 93005 ELECTROCARDIOGRAM TRACING: CPT

## 2025-04-21 PROCEDURE — 83036 HEMOGLOBIN GLYCOSYLATED A1C: CPT

## 2025-04-21 PROCEDURE — 85025 COMPLETE CBC W/AUTO DIFF WBC: CPT

## 2025-04-21 PROCEDURE — C1769: CPT

## 2025-04-21 PROCEDURE — 93458 L HRT ARTERY/VENTRICLE ANGIO: CPT | Mod: 26

## 2025-04-21 PROCEDURE — 93458 L HRT ARTERY/VENTRICLE ANGIO: CPT

## 2025-04-21 PROCEDURE — 82550 ASSAY OF CK (CPK): CPT

## 2025-04-21 PROCEDURE — 93010 ELECTROCARDIOGRAM REPORT: CPT

## 2025-04-21 PROCEDURE — 82553 CREATINE MB FRACTION: CPT

## 2025-04-21 PROCEDURE — 36415 COLL VENOUS BLD VENIPUNCTURE: CPT

## 2025-04-21 PROCEDURE — 82962 GLUCOSE BLOOD TEST: CPT

## 2025-04-21 PROCEDURE — C1887: CPT

## 2025-04-21 RX ORDER — BRIMONIDINE TARTRATE, TIMOLOL MALEATE 2; 5 MG/ML; MG/ML
1 SOLUTION/ DROPS TOPICAL
Refills: 0 | DISCHARGE

## 2025-04-21 RX ORDER — NETARSUDIL AND LATANOPROST OPHTHALMIC SOLUTION, 0.02%/0.005% .2; .05 MG/ML; MG/ML
1 SOLUTION/ DROPS OPHTHALMIC; TOPICAL
Refills: 0 | DISCHARGE

## 2025-04-21 RX ORDER — EMPAGLIFLOZIN 25 MG/1
1 TABLET, FILM COATED ORAL
Refills: 0 | DISCHARGE

## 2025-04-21 RX ORDER — ALPRAZOLAM 0.5 MG
1 TABLET, EXTENDED RELEASE 24 HR ORAL
Refills: 0 | DISCHARGE

## 2025-04-21 RX ORDER — ESCITALOPRAM OXALATE 20 MG/1
1 TABLET ORAL
Refills: 0 | DISCHARGE

## 2025-04-21 RX ORDER — OMEPRAZOLE 20 MG/1
1 CAPSULE, DELAYED RELEASE ORAL
Refills: 0 | DISCHARGE

## 2025-04-21 RX ORDER — INSULIN ASPART 100 [IU]/ML
0 INJECTION, SOLUTION INTRAVENOUS; SUBCUTANEOUS
Refills: 0 | DISCHARGE

## 2025-04-21 RX ORDER — GLUCAGON 3 MG/1
1 POWDER NASAL ONCE
Refills: 0 | Status: DISCONTINUED | OUTPATIENT
Start: 2025-04-21 | End: 2025-04-21

## 2025-04-21 RX ORDER — DEXTROSE 50 % IN WATER 50 %
15 SYRINGE (ML) INTRAVENOUS ONCE
Refills: 0 | Status: DISCONTINUED | OUTPATIENT
Start: 2025-04-21 | End: 2025-04-21

## 2025-04-21 RX ORDER — UBIDECARENONE 100 MG
200 CAPSULE ORAL
Refills: 0 | DISCHARGE

## 2025-04-21 RX ORDER — DEXTROSE 50 % IN WATER 50 %
25 SYRINGE (ML) INTRAVENOUS ONCE
Refills: 0 | Status: DISCONTINUED | OUTPATIENT
Start: 2025-04-21 | End: 2025-04-21

## 2025-04-21 RX ORDER — BIMATOPROST 0.3 MG/ML
1 SOLUTION/ DROPS OPHTHALMIC
Refills: 0 | DISCHARGE

## 2025-04-21 RX ORDER — INSULIN LISPRO 100 U/ML
INJECTION, SOLUTION INTRAVENOUS; SUBCUTANEOUS
Refills: 0 | Status: DISCONTINUED | OUTPATIENT
Start: 2025-04-21 | End: 2025-04-21

## 2025-04-21 RX ORDER — OMEGA-3-ACID ETHYL ESTERS CAPSULES 1 G/1
2 CAPSULE, LIQUID FILLED ORAL
Refills: 0 | DISCHARGE

## 2025-04-21 RX ORDER — DEXTROSE 50 % IN WATER 50 %
12.5 SYRINGE (ML) INTRAVENOUS ONCE
Refills: 0 | Status: DISCONTINUED | OUTPATIENT
Start: 2025-04-21 | End: 2025-04-21

## 2025-04-21 RX ORDER — SODIUM CHLORIDE 9 G/1000ML
1000 INJECTION, SOLUTION INTRAVENOUS
Refills: 0 | Status: DISCONTINUED | OUTPATIENT
Start: 2025-04-21 | End: 2025-04-21

## 2025-04-21 RX ORDER — CLOPIDOGREL BISULFATE 75 MG/1
600 TABLET, FILM COATED ORAL ONCE
Refills: 0 | Status: COMPLETED | OUTPATIENT
Start: 2025-04-21 | End: 2025-04-21

## 2025-04-21 RX ORDER — UBIDECARENONE 100 MG
300 CAPSULE ORAL
Refills: 0 | DISCHARGE

## 2025-04-21 RX ORDER — ATORVASTATIN CALCIUM 80 MG/1
1 TABLET, FILM COATED ORAL
Refills: 0 | DISCHARGE

## 2025-04-21 RX ORDER — INSULIN GLARGINE-YFGN 100 [IU]/ML
36 INJECTION, SOLUTION SUBCUTANEOUS AT BEDTIME
Refills: 0 | Status: DISCONTINUED | OUTPATIENT
Start: 2025-04-21 | End: 2025-04-21

## 2025-04-21 RX ORDER — LISINOPRIL 5 MG/1
1 TABLET ORAL
Refills: 0 | DISCHARGE

## 2025-04-21 RX ORDER — ALFUZOSIN HYDROCHLORIDE 10 MG/1
1 TABLET, EXTENDED RELEASE ORAL
Refills: 0 | DISCHARGE

## 2025-04-21 RX ORDER — BRINZOLAMIDE/BRIMONIDINE TARTRATE 10; 2 MG/ML; MG/ML
1 SUSPENSION/ DROPS OPHTHALMIC
Refills: 0 | DISCHARGE

## 2025-04-21 RX ORDER — MAGNESIUM OXIDE 400 MG
800 TABLET ORAL ONCE
Refills: 0 | Status: COMPLETED | OUTPATIENT
Start: 2025-04-21 | End: 2025-04-21

## 2025-04-21 RX ADMIN — CLOPIDOGREL BISULFATE 600 MILLIGRAM(S): 75 TABLET, FILM COATED ORAL at 09:57

## 2025-04-21 RX ADMIN — Medication 250 MILLILITER(S): at 12:34

## 2025-04-21 RX ADMIN — Medication 500 MILLILITER(S): at 09:57

## 2025-04-21 RX ADMIN — Medication 75 MILLILITER(S): at 09:58

## 2025-04-21 RX ADMIN — Medication 800 MILLIGRAM(S): at 09:57

## 2025-04-21 NOTE — DISCHARGE NOTE PROVIDER - HOSPITAL COURSE
Interventional Cardiology PA SDA Discharge Note    Patient without complaints. Ambulated and voided without difficulties    Afebrile, VSS    Ext: Right Radial :  No hematoma, bleeding, dressing; C/D/I      Pulses:    intact RAD/DP/PT to baseline     A/P:    57M current smoker, PMH HTN, T2DM, HLD, MORALES, surgical hx of gastric sleeve ~2016 years ago at [facility] with Dr. Steward c/b SBO s/p resection and abdominal ventral hernia, whom in light of risk factors, CCS II anginal symptoms and abnormal CCTA, referred for cardiac cath with possible PCI if clinically indicated.     Pt now s/p [facility] 4/21 revealing pLAD 50% (FFR 0.93), RCA mild diffuse, LCx mild diffuse, LM mild diffuse. EDP 12. RRA TR band.    1.	Stable for discharge as per attending Dr. Martinez  after bed rest, pt voids, groin/wrist stable and 30 minutes of ambulation.  2.	Follow-up with PMD/Cardiologist Dr. Tamayo in 1-2 weeks  3.	Discharged forms signed and copies in chart

## 2025-04-21 NOTE — DISCHARGE NOTE PROVIDER - NSDCMRMEDTOKEN_GEN_ALL_CORE_FT
alfuzosin 10 mg oral tablet, extended release: 1 tab(s) orally once a day (at bedtime)  aspirin 81 mg oral delayed release tablet: tab(s) orally once a day  atorvastatin 10 mg oral tablet: 1 tab(s) orally once a day  Coenzyme Q10 200 mg oral capsule: 300 milligram(s) orally once a day  Combigan 0.2%-0.5% ophthalmic solution: 1 drop(s) in each affected eye once a day  Jardiance 25 mg oral tablet: 1 tab(s) orally once a day  Lexapro 20 mg oral tablet: 1 tab(s) orally once a day  lisinopril 5 mg oral tablet: 1 tab(s) orally once a day  Lumigan 0.01% ophthalmic solution: 1 drop(s) in each affected eye once a day  metFORMIN 1000 mg oral tablet: 1 tab(s) orally 2 times a day  NovoLOG 100 units/mL injectable solution: injectable 3 times a day (before meals)  omega-3 polyunsaturated fatty acids 1000 mg oral capsule: 2 cap(s) orally twice a day after breakfast and dinner  omeprazole 40 mg oral delayed release capsule: 1 cap(s) orally once a day  Toprol-XL 50 mg oral tablet, extended release: 1 tab(s) orally once a day  Toujeo SoloStar: 44 unit(s) subcutaneous once a day

## 2025-04-21 NOTE — DISCHARGE NOTE PROVIDER - NSDCFUSCHEDAPPT_GEN_ALL_CORE_FT
Doctor, Unknown  [facility] PreAdmits  Scheduled Appointment: 04/28/2025    [facility] Physician [facility]  CATSCAN  [address]  Scheduled Appointment: 04/28/2025    [facility]  DIAGRAD  [address]  Scheduled Appointment: 04/28/2025    [facility]  GASTRO [address]  Scheduled Appointment: 04/28/2025

## 2025-04-21 NOTE — PROGRESS NOTE ADULT - SUBJECTIVE AND OBJECTIVE BOX
Patient without complaints. Ambulated and voided without difficulties    Afebrile, VSS    Ext: Right Radial :  No hematoma, bleeding, dressing; C/D/I      Pulses:    intact RAD/DP/PT to baseline     A/P:    57M current smoker, PMH HTN, T2DM, HLD, MORALES, surgical hx of gastric sleeve ~2016 years ago at [facility] with Dr. Steward c/b SBO s/p resection and abdominal ventral hernia, whom in light of risk factors, CCS II anginal symptoms and abnormal CCTA, referred for cardiac cath with possible PCI if clinically indicated.     Pt now s/p [facility] 4/21 revealing pLAD 50% (FFR 0.93), RCA mild diffuse, LCx mild diffuse, LM mild diffuse. EDP 12. RRA TR band.    1.	Stable for discharge as per attending Dr. Martinez  after bed rest, pt voids, groin/wrist stable and 30 minutes of ambulation.  2.	Follow-up with PMD/Cardiologist Dr. Tamayo in 1-2 weeks  3.	Discharged forms signed and copies in chart

## 2025-04-28 ENCOUNTER — APPOINTMENT (OUTPATIENT)
Dept: GASTROENTEROLOGY | Facility: CLINIC | Age: 60
End: 2025-04-28
Payer: MEDICAID

## 2025-04-28 ENCOUNTER — OUTPATIENT (OUTPATIENT)
Dept: OUTPATIENT SERVICES | Facility: HOSPITAL | Age: 60
LOS: 1 days | End: 2025-04-28
Payer: COMMERCIAL

## 2025-04-28 ENCOUNTER — APPOINTMENT (OUTPATIENT)
Dept: RADIOLOGY | Facility: HOSPITAL | Age: 60
End: 2025-04-28

## 2025-04-28 ENCOUNTER — APPOINTMENT (OUTPATIENT)
Dept: CT IMAGING | Facility: HOSPITAL | Age: 60
End: 2025-04-28

## 2025-04-28 VITALS
DIASTOLIC BLOOD PRESSURE: 85 MMHG | BODY MASS INDEX: 38.65 KG/M2 | WEIGHT: 255 LBS | TEMPERATURE: 97.9 F | OXYGEN SATURATION: 97 % | SYSTOLIC BLOOD PRESSURE: 147 MMHG | HEIGHT: 68 IN | HEART RATE: 68 BPM

## 2025-04-28 DIAGNOSIS — Z80.0 FAMILY HISTORY OF MALIGNANT NEOPLASM OF DIGESTIVE ORGANS: ICD-10-CM

## 2025-04-28 DIAGNOSIS — Z90.3 ACQUIRED ABSENCE OF STOMACH [PART OF]: Chronic | ICD-10-CM

## 2025-04-28 DIAGNOSIS — K43.9 VENTRAL HERNIA W/OUT OBSTRUCTION OR GANGRENE: ICD-10-CM

## 2025-04-28 DIAGNOSIS — E66.812 OBESITY, CLASS 2: ICD-10-CM

## 2025-04-28 DIAGNOSIS — E66.01 MORBID (SEVERE) OBESITY DUE TO EXCESS CALORIES: ICD-10-CM

## 2025-04-28 DIAGNOSIS — R12 HEARTBURN: ICD-10-CM

## 2025-04-28 PROCEDURE — 74240 X-RAY XM UPR GI TRC 1CNTRST: CPT | Mod: 26

## 2025-04-28 PROCEDURE — 74240 X-RAY XM UPR GI TRC 1CNTRST: CPT

## 2025-04-28 PROCEDURE — 74177 CT ABD & PELVIS W/CONTRAST: CPT | Mod: 26

## 2025-04-28 PROCEDURE — 74177 CT ABD & PELVIS W/CONTRAST: CPT

## 2025-04-28 PROCEDURE — 82565 ASSAY OF CREATININE: CPT

## 2025-04-28 PROCEDURE — 99205 OFFICE O/P NEW HI 60 MIN: CPT

## 2025-04-28 RX ORDER — EMPAGLIFLOZIN 25 MG/1
TABLET, FILM COATED ORAL
Refills: 0 | Status: ACTIVE | COMMUNITY

## 2025-04-29 PROBLEM — K43.9 VENTRAL HERNIA: Status: ACTIVE | Noted: 2025-04-29

## 2025-04-29 PROBLEM — E66.812 CLASS II OBESITY: Status: ACTIVE | Noted: 2025-04-29

## 2025-06-17 ENCOUNTER — NON-APPOINTMENT (OUTPATIENT)
Age: 60
End: 2025-06-17

## 2025-06-26 ENCOUNTER — APPOINTMENT (OUTPATIENT)
Dept: GASTROENTEROLOGY | Facility: HOSPITAL | Age: 60
End: 2025-06-26

## 2025-06-26 ENCOUNTER — OUTPATIENT (OUTPATIENT)
Dept: OUTPATIENT SERVICES | Facility: HOSPITAL | Age: 60
LOS: 1 days | Discharge: HOME | End: 2025-06-26
Payer: COMMERCIAL

## 2025-06-26 ENCOUNTER — RESULT REVIEW (OUTPATIENT)
Age: 60
End: 2025-06-26

## 2025-06-26 VITALS — HEIGHT: 68 IN | WEIGHT: 255.07 LBS

## 2025-06-26 DIAGNOSIS — Z90.3 ACQUIRED ABSENCE OF STOMACH [PART OF]: Chronic | ICD-10-CM

## 2025-06-26 PROCEDURE — 43239 EGD BIOPSY SINGLE/MULTIPLE: CPT

## 2025-06-26 PROCEDURE — 88305 TISSUE EXAM BY PATHOLOGIST: CPT

## 2025-06-26 PROCEDURE — 82962 GLUCOSE BLOOD TEST: CPT

## 2025-06-26 PROCEDURE — 88305 TISSUE EXAM BY PATHOLOGIST: CPT | Mod: 26

## 2025-06-27 ENCOUNTER — NON-APPOINTMENT (OUTPATIENT)
Age: 60
End: 2025-06-27

## 2025-06-27 LAB — SURGICAL PATHOLOGY STUDY: SIGNIFICANT CHANGE UP

## 2025-07-01 NOTE — ED PROVIDER NOTE - ATTESTATION, MLM
Retired I have reviewed and confirmed nurses' notes for patient's medications, allergies, medical history, and surgical history.

## 2025-07-09 ENCOUNTER — APPOINTMENT (OUTPATIENT)
Dept: GASTROENTEROLOGY | Facility: CLINIC | Age: 60
End: 2025-07-09
Payer: MEDICAID

## 2025-07-09 ENCOUNTER — APPOINTMENT (OUTPATIENT)
Dept: BARIATRICS | Facility: CLINIC | Age: 60
End: 2025-07-09
Payer: MEDICAID

## 2025-07-09 VITALS
DIASTOLIC BLOOD PRESSURE: 79 MMHG | TEMPERATURE: 97.1 F | HEIGHT: 68 IN | OXYGEN SATURATION: 98 % | BODY MASS INDEX: 39.25 KG/M2 | WEIGHT: 259 LBS | SYSTOLIC BLOOD PRESSURE: 136 MMHG | HEART RATE: 66 BPM

## 2025-07-09 VITALS
HEART RATE: 61 BPM | HEIGHT: 68 IN | SYSTOLIC BLOOD PRESSURE: 110 MMHG | TEMPERATURE: 97.1 F | OXYGEN SATURATION: 97 % | DIASTOLIC BLOOD PRESSURE: 70 MMHG | WEIGHT: 260 LBS | BODY MASS INDEX: 39.4 KG/M2

## 2025-07-09 PROBLEM — R19.7 DIARRHEA: Status: ACTIVE | Noted: 2025-07-09

## 2025-07-09 PROCEDURE — 99215 OFFICE O/P EST HI 40 MIN: CPT

## 2025-07-14 ENCOUNTER — APPOINTMENT (OUTPATIENT)
Dept: BARIATRICS | Facility: CLINIC | Age: 60
End: 2025-07-14

## 2025-07-16 ENCOUNTER — APPOINTMENT (OUTPATIENT)
Dept: BARIATRICS | Facility: CLINIC | Age: 60
End: 2025-07-16
Payer: MEDICAID

## 2025-07-16 PROCEDURE — 97802 MEDICAL NUTRITION INDIV IN: CPT | Mod: 95

## 2025-08-13 ENCOUNTER — APPOINTMENT (OUTPATIENT)
Dept: BARIATRICS | Facility: CLINIC | Age: 60
End: 2025-08-13
Payer: MEDICAID

## 2025-08-13 PROCEDURE — 97803 MED NUTRITION INDIV SUBSEQ: CPT | Mod: NC,95

## 2025-08-14 PROBLEM — M54.50 LUMBAR PAIN: Status: ACTIVE | Noted: 2025-08-14

## 2025-08-21 ENCOUNTER — APPOINTMENT (OUTPATIENT)
Dept: SPINE | Facility: CLINIC | Age: 60
End: 2025-08-21

## 2025-08-21 DIAGNOSIS — M54.50 LOW BACK PAIN, UNSPECIFIED: ICD-10-CM

## 2025-09-08 ENCOUNTER — RX RENEWAL (OUTPATIENT)
Age: 60
End: 2025-09-08

## 2025-09-09 ENCOUNTER — NON-APPOINTMENT (OUTPATIENT)
Age: 60
End: 2025-09-09

## 2025-09-15 ENCOUNTER — TRANSCRIPTION ENCOUNTER (OUTPATIENT)
Age: 60
End: 2025-09-15

## 2025-09-15 ENCOUNTER — RX RENEWAL (OUTPATIENT)
Age: 60
End: 2025-09-15

## 2025-09-15 ENCOUNTER — APPOINTMENT (OUTPATIENT)
Dept: SURGERY | Facility: HOSPITAL | Age: 60
End: 2025-09-15

## 2025-09-15 ENCOUNTER — APPOINTMENT (OUTPATIENT)
Dept: BARIATRICS | Facility: HOSPITAL | Age: 60
End: 2025-09-15

## (undated) DEVICE — FORCEP RADIAL JAW 4 W NDL 2.2MM 2.8MM 240CM ORANGE DISP